# Patient Record
Sex: FEMALE | Race: WHITE | URBAN - METROPOLITAN AREA
[De-identification: names, ages, dates, MRNs, and addresses within clinical notes are randomized per-mention and may not be internally consistent; named-entity substitution may affect disease eponyms.]

---

## 2019-10-09 ENCOUNTER — EMERGENCY (EMERGENCY)
Facility: HOSPITAL | Age: 64
LOS: 0 days | Discharge: HOME | End: 2019-10-09
Attending: STUDENT IN AN ORGANIZED HEALTH CARE EDUCATION/TRAINING PROGRAM
Payer: COMMERCIAL

## 2019-10-09 VITALS — HEIGHT: 62 IN | WEIGHT: 125 LBS

## 2019-10-09 VITALS
SYSTOLIC BLOOD PRESSURE: 129 MMHG | DIASTOLIC BLOOD PRESSURE: 67 MMHG | RESPIRATION RATE: 18 BRPM | TEMPERATURE: 98 F | HEART RATE: 71 BPM | OXYGEN SATURATION: 98 %

## 2019-10-09 DIAGNOSIS — R10.11 RIGHT UPPER QUADRANT PAIN: ICD-10-CM

## 2019-10-09 DIAGNOSIS — Z85.3 PERSONAL HISTORY OF MALIGNANT NEOPLASM OF BREAST: ICD-10-CM

## 2019-10-09 LAB
ALBUMIN SERPL ELPH-MCNC: 4 G/DL — SIGNIFICANT CHANGE UP (ref 3.3–5)
ALP SERPL-CCNC: 67 U/L — SIGNIFICANT CHANGE UP (ref 40–120)
ALT FLD-CCNC: 31 U/L — SIGNIFICANT CHANGE UP (ref 12–78)
ANION GAP SERPL CALC-SCNC: 10 MMOL/L — SIGNIFICANT CHANGE UP (ref 5–17)
APPEARANCE UR: CLEAR — SIGNIFICANT CHANGE UP
APPEARANCE UR: CLEAR — SIGNIFICANT CHANGE UP
AST SERPL-CCNC: 26 U/L — SIGNIFICANT CHANGE UP (ref 15–37)
BASOPHILS # BLD AUTO: 0.05 K/UL — SIGNIFICANT CHANGE UP (ref 0–0.2)
BASOPHILS NFR BLD AUTO: 0.5 % — SIGNIFICANT CHANGE UP (ref 0–2)
BILIRUB SERPL-MCNC: 0.5 MG/DL — SIGNIFICANT CHANGE UP (ref 0.2–1.2)
BILIRUB UR-MCNC: NEGATIVE — SIGNIFICANT CHANGE UP
BILIRUB UR-MCNC: NEGATIVE — SIGNIFICANT CHANGE UP
BUN SERPL-MCNC: 29 MG/DL — HIGH (ref 7–23)
CALCIUM SERPL-MCNC: 8.8 MG/DL — SIGNIFICANT CHANGE UP (ref 8.5–10.1)
CHLORIDE SERPL-SCNC: 107 MMOL/L — SIGNIFICANT CHANGE UP (ref 96–108)
CO2 SERPL-SCNC: 19 MMOL/L — LOW (ref 22–31)
COLOR SPEC: YELLOW — SIGNIFICANT CHANGE UP
COLOR SPEC: YELLOW — SIGNIFICANT CHANGE UP
CREAT SERPL-MCNC: 1.45 MG/DL — HIGH (ref 0.5–1.3)
DIFF PNL FLD: ABNORMAL
DIFF PNL FLD: NEGATIVE — SIGNIFICANT CHANGE UP
EOSINOPHIL # BLD AUTO: 0.09 K/UL — SIGNIFICANT CHANGE UP (ref 0–0.5)
EOSINOPHIL NFR BLD AUTO: 0.9 % — SIGNIFICANT CHANGE UP (ref 0–6)
GLUCOSE SERPL-MCNC: 123 MG/DL — HIGH (ref 70–99)
GLUCOSE UR QL: NEGATIVE MG/DL — SIGNIFICANT CHANGE UP
GLUCOSE UR QL: NEGATIVE MG/DL — SIGNIFICANT CHANGE UP
HCT VFR BLD CALC: 39.6 % — SIGNIFICANT CHANGE UP (ref 34.5–45)
HGB BLD-MCNC: 13.5 G/DL — SIGNIFICANT CHANGE UP (ref 11.5–15.5)
IMM GRANULOCYTES NFR BLD AUTO: 1.3 % — SIGNIFICANT CHANGE UP (ref 0–1.5)
KETONES UR-MCNC: NEGATIVE — SIGNIFICANT CHANGE UP
KETONES UR-MCNC: NEGATIVE — SIGNIFICANT CHANGE UP
LACTATE SERPL-SCNC: 2.6 MMOL/L — HIGH (ref 0.7–2)
LEUKOCYTE ESTERASE UR-ACNC: ABNORMAL
LEUKOCYTE ESTERASE UR-ACNC: ABNORMAL
LIDOCAIN IGE QN: 294 U/L — SIGNIFICANT CHANGE UP (ref 73–393)
LYMPHOCYTES # BLD AUTO: 1.24 K/UL — SIGNIFICANT CHANGE UP (ref 1–3.3)
LYMPHOCYTES # BLD AUTO: 12.8 % — LOW (ref 13–44)
MAGNESIUM SERPL-MCNC: 1.6 MG/DL — SIGNIFICANT CHANGE UP (ref 1.6–2.6)
MCHC RBC-ENTMCNC: 32.1 PG — SIGNIFICANT CHANGE UP (ref 27–34)
MCHC RBC-ENTMCNC: 34.1 GM/DL — SIGNIFICANT CHANGE UP (ref 32–36)
MCV RBC AUTO: 94.3 FL — SIGNIFICANT CHANGE UP (ref 80–100)
MONOCYTES # BLD AUTO: 0.81 K/UL — SIGNIFICANT CHANGE UP (ref 0–0.9)
MONOCYTES NFR BLD AUTO: 8.4 % — SIGNIFICANT CHANGE UP (ref 2–14)
NEUTROPHILS # BLD AUTO: 7.37 K/UL — SIGNIFICANT CHANGE UP (ref 1.8–7.4)
NEUTROPHILS NFR BLD AUTO: 76.1 % — SIGNIFICANT CHANGE UP (ref 43–77)
NITRITE UR-MCNC: NEGATIVE — SIGNIFICANT CHANGE UP
NITRITE UR-MCNC: NEGATIVE — SIGNIFICANT CHANGE UP
PH UR: 6 — SIGNIFICANT CHANGE UP (ref 5–8)
PH UR: 6 — SIGNIFICANT CHANGE UP (ref 5–8)
PLATELET # BLD AUTO: 256 K/UL — SIGNIFICANT CHANGE UP (ref 150–400)
POTASSIUM SERPL-MCNC: 3.8 MMOL/L — SIGNIFICANT CHANGE UP (ref 3.5–5.3)
POTASSIUM SERPL-SCNC: 3.8 MMOL/L — SIGNIFICANT CHANGE UP (ref 3.5–5.3)
PROT SERPL-MCNC: 7.8 GM/DL — SIGNIFICANT CHANGE UP (ref 6–8.3)
PROT UR-MCNC: 30 MG/DL
PROT UR-MCNC: NEGATIVE MG/DL — SIGNIFICANT CHANGE UP
RBC # BLD: 4.2 M/UL — SIGNIFICANT CHANGE UP (ref 3.8–5.2)
RBC # FLD: 13.2 % — SIGNIFICANT CHANGE UP (ref 10.3–14.5)
SODIUM SERPL-SCNC: 136 MMOL/L — SIGNIFICANT CHANGE UP (ref 135–145)
SP GR SPEC: 1 — LOW (ref 1.01–1.02)
SP GR SPEC: 1.01 — SIGNIFICANT CHANGE UP (ref 1.01–1.02)
UROBILINOGEN FLD QL: NEGATIVE MG/DL — SIGNIFICANT CHANGE UP
UROBILINOGEN FLD QL: NEGATIVE MG/DL — SIGNIFICANT CHANGE UP
WBC # BLD: 9.69 K/UL — SIGNIFICANT CHANGE UP (ref 3.8–10.5)
WBC # FLD AUTO: 9.69 K/UL — SIGNIFICANT CHANGE UP (ref 3.8–10.5)

## 2019-10-09 PROCEDURE — 93005 ELECTROCARDIOGRAM TRACING: CPT

## 2019-10-09 PROCEDURE — 96376 TX/PRO/DX INJ SAME DRUG ADON: CPT

## 2019-10-09 PROCEDURE — 99285 EMERGENCY DEPT VISIT HI MDM: CPT

## 2019-10-09 PROCEDURE — 83605 ASSAY OF LACTIC ACID: CPT

## 2019-10-09 PROCEDURE — 81001 URINALYSIS AUTO W/SCOPE: CPT

## 2019-10-09 PROCEDURE — 36415 COLL VENOUS BLD VENIPUNCTURE: CPT

## 2019-10-09 PROCEDURE — 99285 EMERGENCY DEPT VISIT HI MDM: CPT | Mod: 25

## 2019-10-09 PROCEDURE — 83690 ASSAY OF LIPASE: CPT

## 2019-10-09 PROCEDURE — 93010 ELECTROCARDIOGRAM REPORT: CPT

## 2019-10-09 PROCEDURE — 74176 CT ABD & PELVIS W/O CONTRAST: CPT

## 2019-10-09 PROCEDURE — 80053 COMPREHEN METABOLIC PANEL: CPT

## 2019-10-09 PROCEDURE — 96374 THER/PROPH/DIAG INJ IV PUSH: CPT | Mod: 59

## 2019-10-09 PROCEDURE — 96375 TX/PRO/DX INJ NEW DRUG ADDON: CPT

## 2019-10-09 PROCEDURE — 87086 URINE CULTURE/COLONY COUNT: CPT

## 2019-10-09 PROCEDURE — 85025 COMPLETE CBC W/AUTO DIFF WBC: CPT

## 2019-10-09 PROCEDURE — 74176 CT ABD & PELVIS W/O CONTRAST: CPT | Mod: 26

## 2019-10-09 PROCEDURE — 83735 ASSAY OF MAGNESIUM: CPT

## 2019-10-09 PROCEDURE — 96361 HYDRATE IV INFUSION ADD-ON: CPT

## 2019-10-09 RX ORDER — SODIUM CHLORIDE 9 MG/ML
1000 INJECTION INTRAMUSCULAR; INTRAVENOUS; SUBCUTANEOUS ONCE
Refills: 0 | Status: COMPLETED | OUTPATIENT
Start: 2019-10-09 | End: 2019-10-09

## 2019-10-09 RX ORDER — MORPHINE SULFATE 50 MG/1
4 CAPSULE, EXTENDED RELEASE ORAL ONCE
Refills: 0 | Status: DISCONTINUED | OUTPATIENT
Start: 2019-10-09 | End: 2019-10-09

## 2019-10-09 RX ORDER — FAMOTIDINE 10 MG/ML
20 INJECTION INTRAVENOUS ONCE
Refills: 0 | Status: COMPLETED | OUTPATIENT
Start: 2019-10-09 | End: 2019-10-09

## 2019-10-09 RX ORDER — ONDANSETRON 8 MG/1
4 TABLET, FILM COATED ORAL ONCE
Refills: 0 | Status: COMPLETED | OUTPATIENT
Start: 2019-10-09 | End: 2019-10-09

## 2019-10-09 RX ADMIN — MORPHINE SULFATE 4 MILLIGRAM(S): 50 CAPSULE, EXTENDED RELEASE ORAL at 10:30

## 2019-10-09 RX ADMIN — ONDANSETRON 4 MILLIGRAM(S): 8 TABLET, FILM COATED ORAL at 09:03

## 2019-10-09 RX ADMIN — SODIUM CHLORIDE 1000 MILLILITER(S): 9 INJECTION INTRAMUSCULAR; INTRAVENOUS; SUBCUTANEOUS at 11:19

## 2019-10-09 RX ADMIN — SODIUM CHLORIDE 1000 MILLILITER(S): 9 INJECTION INTRAMUSCULAR; INTRAVENOUS; SUBCUTANEOUS at 10:15

## 2019-10-09 RX ADMIN — Medication 30 MILLILITER(S): at 11:21

## 2019-10-09 RX ADMIN — MORPHINE SULFATE 4 MILLIGRAM(S): 50 CAPSULE, EXTENDED RELEASE ORAL at 09:02

## 2019-10-09 RX ADMIN — SODIUM CHLORIDE 1000 MILLILITER(S): 9 INJECTION INTRAMUSCULAR; INTRAVENOUS; SUBCUTANEOUS at 09:03

## 2019-10-09 RX ADMIN — MORPHINE SULFATE 4 MILLIGRAM(S): 50 CAPSULE, EXTENDED RELEASE ORAL at 09:17

## 2019-10-09 RX ADMIN — SODIUM CHLORIDE 1000 MILLILITER(S): 9 INJECTION INTRAMUSCULAR; INTRAVENOUS; SUBCUTANEOUS at 10:11

## 2019-10-09 RX ADMIN — MORPHINE SULFATE 4 MILLIGRAM(S): 50 CAPSULE, EXTENDED RELEASE ORAL at 10:11

## 2019-10-09 RX ADMIN — FAMOTIDINE 20 MILLIGRAM(S): 10 INJECTION INTRAVENOUS at 10:13

## 2019-10-09 NOTE — ED ADULT NURSE NOTE - NSIMPLEMENTINTERV_GEN_ALL_ED
Implemented All Universal Safety Interventions:  [place] to call system. Call bell, personal items and telephone within reach. Instruct patient to call for assistance. Room bathroom lighting operational. Non-slip footwear when patient is off stretcher. Physically safe environment: no spills, clutter or unnecessary equipment. Stretcher in lowest position, wheels locked, appropriate side rails in place.

## 2019-10-09 NOTE — ED PROVIDER NOTE - PROGRESS NOTE DETAILS
Buddy DO: Patient feeling better at this time after pepcid; IVF running; no urinary complaints- will not treat- shared decision making; patient given copy of all labs, ct, urine; instructed to f/u with her private GI in 1-2 days without fail; strict return precautions given.

## 2019-10-09 NOTE — ED PROVIDER NOTE - CONSTITUTIONAL, MLM
normal... Well appearing, well nourished, awake, alert, oriented to person, place, time/situation and in mild/moderate apparent painful distress.

## 2019-10-09 NOTE — ED ADULT NURSE NOTE - OBJECTIVE STATEMENT
Patient presents to ED A&o x3,  ambulating from triage to room c/o upper epigastric abdominal pain. hx of breast ca, right mastectomy and colon resection w ostomy, As per patient pain began at 2am this morning.  Pt describes pain as sudden onset, intermittent sharp epigastric region. Pt denies eating abnormally. Pt endorses intermittent nausea, denies V/D/C. Patient denies taking medications for symptoms. Pt denies CP, SOB, HA, dizziness, vision changes, and urinary symptoms. Family present at bedside.

## 2019-10-09 NOTE — ED ADULT NURSE NOTE - GASTROINTESTINAL WDL
Abdomen soft, nontender, nondistended, pt has ostomy in lower abdominal region. Ostomy assessed- appears WNL. pt denies complaints.

## 2019-10-09 NOTE — ED PROVIDER NOTE - PATIENT PORTAL LINK FT
You can access the FollowMyHealth Patient Portal offered by [facility] by registering at the following website: [email or web address]. By joining [facility]’s FollowMyHealth portal, you will also be able to view your health information using other applications (apps) compatible with our system.

## 2019-10-09 NOTE — ED PROVIDER NOTE - OBJECTIVE STATEMENT
65 yo female with upper abdominal pain that awoke patient from sleep at 2am this morning; patient reports similar symptoms 2 weeks ago that went away; no chest pain; no sob; ?nausea; no vomiting; hx of colon resection with osteomy; hx of crohn's disease; no fever or chills; no cough; no urinary complaints.

## 2019-10-09 NOTE — ED ADULT TRIAGE NOTE - CHIEF COMPLAINT QUOTE
Patient presents with mid upper abdominal pain since 2 AM. Patient reports pain started suddenly with nausea. Patient denies nausea vomiting or diarrhea. Patient has history of Crohn's. SAMANTHA Cabrera

## 2019-10-10 LAB
CULTURE RESULTS: SIGNIFICANT CHANGE UP
SPECIMEN SOURCE: SIGNIFICANT CHANGE UP

## 2019-10-13 RX ORDER — AMOXICILLIN 250 MG/5ML
1 SUSPENSION, RECONSTITUTED, ORAL (ML) ORAL
Qty: 0 | Refills: 0 | DISCHARGE
Start: 2019-10-13 | End: 2019-10-23

## 2019-10-14 ENCOUNTER — INPATIENT (INPATIENT)
Facility: HOSPITAL | Age: 64
LOS: 3 days | Discharge: HOME | DRG: 871 | End: 2019-10-18
Attending: HOSPITALIST | Admitting: FAMILY MEDICINE
Payer: COMMERCIAL

## 2019-10-14 VITALS — WEIGHT: 125 LBS | HEIGHT: 62 IN

## 2019-10-14 DIAGNOSIS — R50.9 FEVER, UNSPECIFIED: ICD-10-CM

## 2019-10-14 LAB
ALBUMIN SERPL ELPH-MCNC: 3.3 G/DL — SIGNIFICANT CHANGE UP (ref 3.3–5)
ALP SERPL-CCNC: 59 U/L — SIGNIFICANT CHANGE UP (ref 40–120)
ALT FLD-CCNC: 32 U/L — SIGNIFICANT CHANGE UP (ref 12–78)
ANION GAP SERPL CALC-SCNC: 12 MMOL/L — SIGNIFICANT CHANGE UP (ref 5–17)
APPEARANCE UR: CLEAR — SIGNIFICANT CHANGE UP
APTT BLD: 28.7 SEC — SIGNIFICANT CHANGE UP (ref 27.5–36.3)
AST SERPL-CCNC: 35 U/L — SIGNIFICANT CHANGE UP (ref 15–37)
BASOPHILS # BLD AUTO: 0.04 K/UL — SIGNIFICANT CHANGE UP (ref 0–0.2)
BASOPHILS NFR BLD AUTO: 0.5 % — SIGNIFICANT CHANGE UP (ref 0–2)
BILIRUB SERPL-MCNC: 0.7 MG/DL — SIGNIFICANT CHANGE UP (ref 0.2–1.2)
BILIRUB UR-MCNC: NEGATIVE — SIGNIFICANT CHANGE UP
BUN SERPL-MCNC: 24 MG/DL — HIGH (ref 7–23)
CALCIUM SERPL-MCNC: 9.4 MG/DL — SIGNIFICANT CHANGE UP (ref 8.5–10.1)
CHLORIDE SERPL-SCNC: 102 MMOL/L — SIGNIFICANT CHANGE UP (ref 96–108)
CO2 SERPL-SCNC: 21 MMOL/L — LOW (ref 22–31)
COLOR SPEC: YELLOW — SIGNIFICANT CHANGE UP
CREAT SERPL-MCNC: 1.42 MG/DL — HIGH (ref 0.5–1.3)
DIFF PNL FLD: ABNORMAL
EOSINOPHIL # BLD AUTO: 0.01 K/UL — SIGNIFICANT CHANGE UP (ref 0–0.5)
EOSINOPHIL NFR BLD AUTO: 0.1 % — SIGNIFICANT CHANGE UP (ref 0–6)
GLUCOSE SERPL-MCNC: 99 MG/DL — SIGNIFICANT CHANGE UP (ref 70–99)
GLUCOSE UR QL: NEGATIVE MG/DL — SIGNIFICANT CHANGE UP
HCT VFR BLD CALC: 38.8 % — SIGNIFICANT CHANGE UP (ref 34.5–45)
HGB BLD-MCNC: 13.1 G/DL — SIGNIFICANT CHANGE UP (ref 11.5–15.5)
IMM GRANULOCYTES NFR BLD AUTO: 0.9 % — SIGNIFICANT CHANGE UP (ref 0–1.5)
INR BLD: 1.03 RATIO — SIGNIFICANT CHANGE UP (ref 0.88–1.16)
KETONES UR-MCNC: NEGATIVE — SIGNIFICANT CHANGE UP
LACTATE SERPL-SCNC: 1.6 MMOL/L — SIGNIFICANT CHANGE UP (ref 0.7–2)
LEUKOCYTE ESTERASE UR-ACNC: NEGATIVE — SIGNIFICANT CHANGE UP
LYMPHOCYTES # BLD AUTO: 0.74 K/UL — LOW (ref 1–3.3)
LYMPHOCYTES # BLD AUTO: 8.7 % — LOW (ref 13–44)
MCHC RBC-ENTMCNC: 31.3 PG — SIGNIFICANT CHANGE UP (ref 27–34)
MCHC RBC-ENTMCNC: 33.8 GM/DL — SIGNIFICANT CHANGE UP (ref 32–36)
MCV RBC AUTO: 92.8 FL — SIGNIFICANT CHANGE UP (ref 80–100)
MONOCYTES # BLD AUTO: 0.43 K/UL — SIGNIFICANT CHANGE UP (ref 0–0.9)
MONOCYTES NFR BLD AUTO: 5 % — SIGNIFICANT CHANGE UP (ref 2–14)
NEUTROPHILS # BLD AUTO: 7.25 K/UL — SIGNIFICANT CHANGE UP (ref 1.8–7.4)
NEUTROPHILS NFR BLD AUTO: 84.8 % — HIGH (ref 43–77)
NITRITE UR-MCNC: NEGATIVE — SIGNIFICANT CHANGE UP
PH UR: 6 — SIGNIFICANT CHANGE UP (ref 5–8)
PLATELET # BLD AUTO: 188 K/UL — SIGNIFICANT CHANGE UP (ref 150–400)
POTASSIUM SERPL-MCNC: 3.2 MMOL/L — LOW (ref 3.5–5.3)
POTASSIUM SERPL-SCNC: 3.2 MMOL/L — LOW (ref 3.5–5.3)
PROT SERPL-MCNC: 8 GM/DL — SIGNIFICANT CHANGE UP (ref 6–8.3)
PROT UR-MCNC: 100 MG/DL
PROTHROM AB SERPL-ACNC: 11.5 SEC — SIGNIFICANT CHANGE UP (ref 10–12.9)
RBC # BLD: 4.18 M/UL — SIGNIFICANT CHANGE UP (ref 3.8–5.2)
RBC # FLD: 13.4 % — SIGNIFICANT CHANGE UP (ref 10.3–14.5)
SODIUM SERPL-SCNC: 135 MMOL/L — SIGNIFICANT CHANGE UP (ref 135–145)
SP GR SPEC: 1.01 — SIGNIFICANT CHANGE UP (ref 1.01–1.02)
UROBILINOGEN FLD QL: NEGATIVE MG/DL — SIGNIFICANT CHANGE UP
WBC # BLD: 8.55 K/UL — SIGNIFICANT CHANGE UP (ref 3.8–10.5)
WBC # FLD AUTO: 8.55 K/UL — SIGNIFICANT CHANGE UP (ref 3.8–10.5)

## 2019-10-14 PROCEDURE — 87798 DETECT AGENT NOS DNA AMP: CPT

## 2019-10-14 PROCEDURE — 76700 US EXAM ABDOM COMPLETE: CPT

## 2019-10-14 PROCEDURE — 85652 RBC SED RATE AUTOMATED: CPT

## 2019-10-14 PROCEDURE — 86803 HEPATITIS C AB TEST: CPT

## 2019-10-14 PROCEDURE — C9113: CPT

## 2019-10-14 PROCEDURE — 70450 CT HEAD/BRAIN W/O DYE: CPT

## 2019-10-14 PROCEDURE — 71045 X-RAY EXAM CHEST 1 VIEW: CPT | Mod: 26

## 2019-10-14 PROCEDURE — 83605 ASSAY OF LACTIC ACID: CPT

## 2019-10-14 PROCEDURE — 71250 CT THORAX DX C-: CPT

## 2019-10-14 PROCEDURE — 80307 DRUG TEST PRSMV CHEM ANLYZR: CPT

## 2019-10-14 PROCEDURE — 87633 RESP VIRUS 12-25 TARGETS: CPT

## 2019-10-14 PROCEDURE — 86618 LYME DISEASE ANTIBODY: CPT

## 2019-10-14 PROCEDURE — 87040 BLOOD CULTURE FOR BACTERIA: CPT

## 2019-10-14 PROCEDURE — 82962 GLUCOSE BLOOD TEST: CPT

## 2019-10-14 PROCEDURE — 82728 ASSAY OF FERRITIN: CPT

## 2019-10-14 PROCEDURE — 74181 MRI ABDOMEN W/O CONTRAST: CPT

## 2019-10-14 PROCEDURE — 80048 BASIC METABOLIC PNL TOTAL CA: CPT

## 2019-10-14 PROCEDURE — 94640 AIRWAY INHALATION TREATMENT: CPT

## 2019-10-14 PROCEDURE — 87486 CHLMYD PNEUM DNA AMP PROBE: CPT

## 2019-10-14 PROCEDURE — 86778 TOXOPLASMA ANTIBODY IGM: CPT

## 2019-10-14 PROCEDURE — 93010 ELECTROCARDIOGRAM REPORT: CPT

## 2019-10-14 PROCEDURE — 86140 C-REACTIVE PROTEIN: CPT

## 2019-10-14 PROCEDURE — 87507 IADNA-DNA/RNA PROBE TQ 12-25: CPT

## 2019-10-14 PROCEDURE — 83615 LACTATE (LD) (LDH) ENZYME: CPT

## 2019-10-14 PROCEDURE — 36415 COLL VENOUS BLD VENIPUNCTURE: CPT

## 2019-10-14 PROCEDURE — 80053 COMPREHEN METABOLIC PANEL: CPT

## 2019-10-14 PROCEDURE — 84100 ASSAY OF PHOSPHORUS: CPT

## 2019-10-14 PROCEDURE — 86645 CMV ANTIBODY IGM: CPT

## 2019-10-14 PROCEDURE — 83036 HEMOGLOBIN GLYCOSYLATED A1C: CPT

## 2019-10-14 PROCEDURE — 85027 COMPLETE CBC AUTOMATED: CPT

## 2019-10-14 PROCEDURE — 81001 URINALYSIS AUTO W/SCOPE: CPT

## 2019-10-14 PROCEDURE — 85025 COMPLETE CBC W/AUTO DIFF WBC: CPT

## 2019-10-14 PROCEDURE — 83735 ASSAY OF MAGNESIUM: CPT

## 2019-10-14 PROCEDURE — 87581 M.PNEUMON DNA AMP PROBE: CPT

## 2019-10-14 PROCEDURE — 87086 URINE CULTURE/COLONY COUNT: CPT

## 2019-10-14 RX ORDER — SODIUM CHLORIDE 9 MG/ML
1750 INJECTION INTRAMUSCULAR; INTRAVENOUS; SUBCUTANEOUS ONCE
Refills: 0 | Status: COMPLETED | OUTPATIENT
Start: 2019-10-14 | End: 2019-10-14

## 2019-10-14 RX ORDER — ACETAMINOPHEN 500 MG
650 TABLET ORAL ONCE
Refills: 0 | Status: COMPLETED | OUTPATIENT
Start: 2019-10-14 | End: 2019-10-14

## 2019-10-14 RX ORDER — ACETAMINOPHEN 500 MG
650 TABLET ORAL EVERY 6 HOURS
Refills: 0 | Status: DISCONTINUED | OUTPATIENT
Start: 2019-10-14 | End: 2019-10-15

## 2019-10-14 RX ORDER — VANCOMYCIN HCL 1 G
750 VIAL (EA) INTRAVENOUS ONCE
Refills: 0 | Status: COMPLETED | OUTPATIENT
Start: 2019-10-14 | End: 2019-10-14

## 2019-10-14 RX ORDER — CEFEPIME 1 G/1
2000 INJECTION, POWDER, FOR SOLUTION INTRAMUSCULAR; INTRAVENOUS ONCE
Refills: 0 | Status: COMPLETED | OUTPATIENT
Start: 2019-10-14 | End: 2019-10-14

## 2019-10-14 RX ORDER — SODIUM CHLORIDE 9 MG/ML
1000 INJECTION INTRAMUSCULAR; INTRAVENOUS; SUBCUTANEOUS
Refills: 0 | Status: DISCONTINUED | OUTPATIENT
Start: 2019-10-14 | End: 2019-10-15

## 2019-10-14 RX ADMIN — Medication 650 MILLIGRAM(S): at 21:11

## 2019-10-14 RX ADMIN — CEFEPIME 2000 MILLIGRAM(S): 1 INJECTION, POWDER, FOR SOLUTION INTRAMUSCULAR; INTRAVENOUS at 21:25

## 2019-10-14 RX ADMIN — Medication 750 MILLIGRAM(S): at 22:30

## 2019-10-14 RX ADMIN — Medication 650 MILLIGRAM(S): at 23:25

## 2019-10-14 RX ADMIN — CEFEPIME 100 MILLIGRAM(S): 1 INJECTION, POWDER, FOR SOLUTION INTRAMUSCULAR; INTRAVENOUS at 21:11

## 2019-10-14 RX ADMIN — SODIUM CHLORIDE 1750 MILLILITER(S): 9 INJECTION INTRAMUSCULAR; INTRAVENOUS; SUBCUTANEOUS at 21:11

## 2019-10-14 RX ADMIN — SODIUM CHLORIDE 1750 MILLILITER(S): 9 INJECTION INTRAMUSCULAR; INTRAVENOUS; SUBCUTANEOUS at 22:30

## 2019-10-14 RX ADMIN — Medication 250 MILLIGRAM(S): at 21:25

## 2019-10-14 NOTE — ED STATDOCS - PROGRESS NOTE DETAILS
Chap[place] Martinez for ED attending Dr. Casanova: 65 y/o f with PMHx of Crohn's presenting to the ED c/o fever x4 days. Tmax of 104.1 today. Last took Motrin at 4pm today. Seen at Urgent Care and given Amoxicillin, UC said pt had fluid in sinuses. Pt seen in ED x5 days ago for upper abd pain, states pain is still present but has improved. PCP: Dr. Austin. Will send pt to main ED for further evaluation.

## 2019-10-14 NOTE — ED PROVIDER NOTE - OBJECTIVE STATEMENT
Pt comes to the ER for continued fevers up to 104 today. Pt was seen in the ER on Thursday for abdominal pain CT was negative for a positive findings. Began to develop fevers on Friday which progressively increased. Was taking Ibuprofen which did reduce the temperature, Had chills with N/V/D

## 2019-10-14 NOTE — ED ADULT NURSE NOTE - OBJECTIVE STATEMENT
pt comes to the ER for continued fevers up to 104 today. Pt was seen in the ER on Thursday for abdo pain CT was negative for a positive findings. Began to develop fevers on Friday which progressivlely increased. Was taking Ibuprofen which did reduce the temperature, Had chills with N/V/D has been able to drink fluids without vomiting. Denies and recent illnesses, denies cough, frequency urgency, does c/o frontal headache without visual changes.   PE alert oriented HEENT NL cephalic neck supple no thyroid  enlargement, chest clear, NRS S1 S2 abdo soft not tender, ext negative swelling.

## 2019-10-14 NOTE — ED ADULT TRIAGE NOTE - CHIEF COMPLAINT QUOTE
Fever since Friday. highest 104.1. urgent care gave amoxicillin and said patient had fluid in sinuses. patient was seen in  ED Wednesday for abdominal pain.

## 2019-10-14 NOTE — ED PROVIDER NOTE - PROGRESS NOTE DETAILS
pt comes to the ER for continued fevers up to 104 today. Pt was seen in the ER on Thursday for abdo pain CT was negative for a positive findings. Began to develop fevers on Friday which progressivlely increased. Was taking Ibuprofen which did reduce the temperature, Had chills with N/V/D has been able to drink fluids without vomiting. Denies and recent illnesses, denies cough, frequency urgency, does c/o frontal headache without visual changes.   PE alert oriented HEENT NL cephalic neck supple no thyroid  enlargement, chest clear, NRS S1 S2 abdo soft not tender, ext negative swelling

## 2019-10-14 NOTE — ED ADULT NURSE NOTE - NSIMPLEMENTINTERV_GEN_ALL_ED
Implemented All Universal Safety Interventions:  [place] to call system. Call bell, personal items and telephone within reach. Instruct patient to call for assistance. Room bathroom lighting operational. Non-slip footwear when patient is off stretcher. Physically safe environment: no spills, clutter or unnecessary equipment. Stretcher in lowest position, wheels locked, appropriate side rails in place.

## 2019-10-15 DIAGNOSIS — Z98.890 OTHER SPECIFIED POSTPROCEDURAL STATES: Chronic | ICD-10-CM

## 2019-10-15 DIAGNOSIS — Z90.11 ACQUIRED ABSENCE OF RIGHT BREAST AND NIPPLE: Chronic | ICD-10-CM

## 2019-10-15 DIAGNOSIS — Z90.49 ACQUIRED ABSENCE OF OTHER SPECIFIED PARTS OF DIGESTIVE TRACT: Chronic | ICD-10-CM

## 2019-10-15 PROBLEM — C50.919 MALIGNANT NEOPLASM OF UNSPECIFIED SITE OF UNSPECIFIED FEMALE BREAST: Chronic | Status: ACTIVE | Noted: 2019-10-09

## 2019-10-15 LAB
ADD ON TEST-SPECIMEN IN LAB: SIGNIFICANT CHANGE UP
ALBUMIN SERPL ELPH-MCNC: 2.5 G/DL — LOW (ref 3.3–5)
ALP SERPL-CCNC: 44 U/L — SIGNIFICANT CHANGE UP (ref 40–120)
ALT FLD-CCNC: 26 U/L — SIGNIFICANT CHANGE UP (ref 12–78)
ANION GAP SERPL CALC-SCNC: 10 MMOL/L — SIGNIFICANT CHANGE UP (ref 5–17)
AST SERPL-CCNC: 30 U/L — SIGNIFICANT CHANGE UP (ref 15–37)
BASOPHILS # BLD AUTO: 0.02 K/UL — SIGNIFICANT CHANGE UP (ref 0–0.2)
BASOPHILS NFR BLD AUTO: 0.4 % — SIGNIFICANT CHANGE UP (ref 0–2)
BILIRUB SERPL-MCNC: 0.5 MG/DL — SIGNIFICANT CHANGE UP (ref 0.2–1.2)
BUN SERPL-MCNC: 18 MG/DL — SIGNIFICANT CHANGE UP (ref 7–23)
CALCIUM SERPL-MCNC: 7.9 MG/DL — LOW (ref 8.5–10.1)
CHLORIDE SERPL-SCNC: 112 MMOL/L — HIGH (ref 96–108)
CO2 SERPL-SCNC: 19 MMOL/L — LOW (ref 22–31)
CREAT SERPL-MCNC: 1.04 MG/DL — SIGNIFICANT CHANGE UP (ref 0.5–1.3)
EOSINOPHIL # BLD AUTO: 0.01 K/UL — SIGNIFICANT CHANGE UP (ref 0–0.5)
EOSINOPHIL NFR BLD AUTO: 0.2 % — SIGNIFICANT CHANGE UP (ref 0–6)
GLUCOSE SERPL-MCNC: 107 MG/DL — HIGH (ref 70–99)
HBA1C BLD-MCNC: 5.9 % — HIGH (ref 4–5.6)
HCT VFR BLD CALC: 31.1 % — LOW (ref 34.5–45)
HCV AB S/CO SERPL IA: 0.1 S/CO — SIGNIFICANT CHANGE UP (ref 0–0.99)
HCV AB SERPL-IMP: SIGNIFICANT CHANGE UP
HGB BLD-MCNC: 10.4 G/DL — LOW (ref 11.5–15.5)
IMM GRANULOCYTES NFR BLD AUTO: 1.5 % — SIGNIFICANT CHANGE UP (ref 0–1.5)
LACTATE SERPL-SCNC: 1.6 MMOL/L — SIGNIFICANT CHANGE UP (ref 0.7–2)
LYMPHOCYTES # BLD AUTO: 0.63 K/UL — LOW (ref 1–3.3)
LYMPHOCYTES # BLD AUTO: 12.2 % — LOW (ref 13–44)
MAGNESIUM SERPL-MCNC: 1.7 MG/DL — SIGNIFICANT CHANGE UP (ref 1.6–2.6)
MCHC RBC-ENTMCNC: 31.2 PG — SIGNIFICANT CHANGE UP (ref 27–34)
MCHC RBC-ENTMCNC: 33.4 GM/DL — SIGNIFICANT CHANGE UP (ref 32–36)
MCV RBC AUTO: 93.4 FL — SIGNIFICANT CHANGE UP (ref 80–100)
MONOCYTES # BLD AUTO: 0.33 K/UL — SIGNIFICANT CHANGE UP (ref 0–0.9)
MONOCYTES NFR BLD AUTO: 6.4 % — SIGNIFICANT CHANGE UP (ref 2–14)
NEUTROPHILS # BLD AUTO: 4.11 K/UL — SIGNIFICANT CHANGE UP (ref 1.8–7.4)
NEUTROPHILS NFR BLD AUTO: 79.3 % — HIGH (ref 43–77)
PCP SPEC-MCNC: SIGNIFICANT CHANGE UP
PHOSPHATE SERPL-MCNC: 2.7 MG/DL — SIGNIFICANT CHANGE UP (ref 2.5–4.5)
PLATELET # BLD AUTO: 152 K/UL — SIGNIFICANT CHANGE UP (ref 150–400)
POTASSIUM SERPL-MCNC: 3.2 MMOL/L — LOW (ref 3.5–5.3)
POTASSIUM SERPL-SCNC: 3.2 MMOL/L — LOW (ref 3.5–5.3)
PROT SERPL-MCNC: 6 GM/DL — SIGNIFICANT CHANGE UP (ref 6–8.3)
RAPID RVP RESULT: SIGNIFICANT CHANGE UP
RBC # BLD: 3.33 M/UL — LOW (ref 3.8–5.2)
RBC # FLD: 13.3 % — SIGNIFICANT CHANGE UP (ref 10.3–14.5)
SODIUM SERPL-SCNC: 141 MMOL/L — SIGNIFICANT CHANGE UP (ref 135–145)
WBC # BLD: 5.18 K/UL — SIGNIFICANT CHANGE UP (ref 3.8–10.5)
WBC # FLD AUTO: 5.18 K/UL — SIGNIFICANT CHANGE UP (ref 3.8–10.5)

## 2019-10-15 PROCEDURE — 71250 CT THORAX DX C-: CPT | Mod: 26

## 2019-10-15 PROCEDURE — 76700 US EXAM ABDOM COMPLETE: CPT | Mod: 26

## 2019-10-15 PROCEDURE — 74181 MRI ABDOMEN W/O CONTRAST: CPT | Mod: 26

## 2019-10-15 PROCEDURE — 70450 CT HEAD/BRAIN W/O DYE: CPT | Mod: 26

## 2019-10-15 RX ORDER — PANTOPRAZOLE SODIUM 20 MG/1
40 TABLET, DELAYED RELEASE ORAL DAILY
Refills: 0 | Status: DISCONTINUED | OUTPATIENT
Start: 2019-10-15 | End: 2019-10-18

## 2019-10-15 RX ORDER — IPRATROPIUM/ALBUTEROL SULFATE 18-103MCG
3 AEROSOL WITH ADAPTER (GRAM) INHALATION EVERY 6 HOURS
Refills: 0 | Status: DISCONTINUED | OUTPATIENT
Start: 2019-10-15 | End: 2019-10-17

## 2019-10-15 RX ORDER — PIPERACILLIN AND TAZOBACTAM 4; .5 G/20ML; G/20ML
3.38 INJECTION, POWDER, LYOPHILIZED, FOR SOLUTION INTRAVENOUS ONCE
Refills: 0 | Status: COMPLETED | OUTPATIENT
Start: 2019-10-15 | End: 2019-10-15

## 2019-10-15 RX ORDER — ACETAMINOPHEN 500 MG
650 TABLET ORAL EVERY 6 HOURS
Refills: 0 | Status: DISCONTINUED | OUTPATIENT
Start: 2019-10-15 | End: 2019-10-18

## 2019-10-15 RX ORDER — POTASSIUM CHLORIDE 20 MEQ
40 PACKET (EA) ORAL ONCE
Refills: 0 | Status: COMPLETED | OUTPATIENT
Start: 2019-10-15 | End: 2019-10-15

## 2019-10-15 RX ORDER — ACETAMINOPHEN 500 MG
1000 TABLET ORAL ONCE
Refills: 0 | Status: COMPLETED | OUTPATIENT
Start: 2019-10-15 | End: 2019-10-15

## 2019-10-15 RX ORDER — TAMOXIFEN CITRATE 20 MG/1
20 TABLET, FILM COATED ORAL DAILY
Refills: 0 | Status: DISCONTINUED | OUTPATIENT
Start: 2019-10-15 | End: 2019-10-18

## 2019-10-15 RX ORDER — ACETAMINOPHEN 500 MG
650 TABLET ORAL ONCE
Refills: 0 | Status: COMPLETED | OUTPATIENT
Start: 2019-10-15 | End: 2019-10-15

## 2019-10-15 RX ORDER — HEPARIN SODIUM 5000 [USP'U]/ML
5000 INJECTION INTRAVENOUS; SUBCUTANEOUS EVERY 8 HOURS
Refills: 0 | Status: DISCONTINUED | OUTPATIENT
Start: 2019-10-15 | End: 2019-10-18

## 2019-10-15 RX ORDER — ACETAMINOPHEN 500 MG
650 TABLET ORAL EVERY 6 HOURS
Refills: 0 | Status: DISCONTINUED | OUTPATIENT
Start: 2019-10-15 | End: 2019-10-15

## 2019-10-15 RX ORDER — POTASSIUM CHLORIDE 20 MEQ
10 PACKET (EA) ORAL
Refills: 0 | Status: COMPLETED | OUTPATIENT
Start: 2019-10-15 | End: 2019-10-15

## 2019-10-15 RX ORDER — POTASSIUM CHLORIDE 20 MEQ
40 PACKET (EA) ORAL EVERY 4 HOURS
Refills: 0 | Status: DISCONTINUED | OUTPATIENT
Start: 2019-10-15 | End: 2019-10-15

## 2019-10-15 RX ORDER — SODIUM CHLORIDE 9 MG/ML
1000 INJECTION INTRAMUSCULAR; INTRAVENOUS; SUBCUTANEOUS
Refills: 0 | Status: DISCONTINUED | OUTPATIENT
Start: 2019-10-15 | End: 2019-10-17

## 2019-10-15 RX ORDER — SODIUM CHLORIDE 9 MG/ML
1000 INJECTION, SOLUTION INTRAVENOUS ONCE
Refills: 0 | Status: COMPLETED | OUTPATIENT
Start: 2019-10-15 | End: 2019-10-15

## 2019-10-15 RX ORDER — CEFTRIAXONE 500 MG/1
1000 INJECTION, POWDER, FOR SOLUTION INTRAMUSCULAR; INTRAVENOUS EVERY 24 HOURS
Refills: 0 | Status: DISCONTINUED | OUTPATIENT
Start: 2019-10-15 | End: 2019-10-15

## 2019-10-15 RX ORDER — INFLUENZA VIRUS VACCINE 15; 15; 15; 15 UG/.5ML; UG/.5ML; UG/.5ML; UG/.5ML
0.5 SUSPENSION INTRAMUSCULAR ONCE
Refills: 0 | Status: COMPLETED | OUTPATIENT
Start: 2019-10-15 | End: 2019-10-15

## 2019-10-15 RX ORDER — AZITHROMYCIN 500 MG/1
500 TABLET, FILM COATED ORAL ONCE
Refills: 0 | Status: COMPLETED | OUTPATIENT
Start: 2019-10-15 | End: 2019-10-15

## 2019-10-15 RX ORDER — AZITHROMYCIN 500 MG/1
TABLET, FILM COATED ORAL
Refills: 0 | Status: DISCONTINUED | OUTPATIENT
Start: 2019-10-15 | End: 2019-10-15

## 2019-10-15 RX ORDER — CHOLECALCIFEROL (VITAMIN D3) 125 MCG
1000 CAPSULE ORAL DAILY
Refills: 0 | Status: DISCONTINUED | OUTPATIENT
Start: 2019-10-15 | End: 2019-10-18

## 2019-10-15 RX ORDER — ONDANSETRON 8 MG/1
4 TABLET, FILM COATED ORAL EVERY 6 HOURS
Refills: 0 | Status: DISCONTINUED | OUTPATIENT
Start: 2019-10-15 | End: 2019-10-18

## 2019-10-15 RX ORDER — SODIUM CHLORIDE 9 MG/ML
750 INJECTION, SOLUTION INTRAVENOUS ONCE
Refills: 0 | Status: COMPLETED | OUTPATIENT
Start: 2019-10-15 | End: 2019-10-15

## 2019-10-15 RX ORDER — PIPERACILLIN AND TAZOBACTAM 4; .5 G/20ML; G/20ML
3.38 INJECTION, POWDER, LYOPHILIZED, FOR SOLUTION INTRAVENOUS EVERY 8 HOURS
Refills: 0 | Status: DISCONTINUED | OUTPATIENT
Start: 2019-10-15 | End: 2019-10-16

## 2019-10-15 RX ORDER — BUDESONIDE AND FORMOTEROL FUMARATE DIHYDRATE 160; 4.5 UG/1; UG/1
2 AEROSOL RESPIRATORY (INHALATION)
Refills: 0 | Status: DISCONTINUED | OUTPATIENT
Start: 2019-10-15 | End: 2019-10-18

## 2019-10-15 RX ADMIN — Medication 100 MILLIEQUIVALENT(S): at 04:39

## 2019-10-15 RX ADMIN — AZITHROMYCIN 255 MILLIGRAM(S): 500 TABLET, FILM COATED ORAL at 08:30

## 2019-10-15 RX ADMIN — Medication 650 MILLIGRAM(S): at 23:30

## 2019-10-15 RX ADMIN — Medication 3 MILLILITER(S): at 13:21

## 2019-10-15 RX ADMIN — HEPARIN SODIUM 5000 UNIT(S): 5000 INJECTION INTRAVENOUS; SUBCUTANEOUS at 06:26

## 2019-10-15 RX ADMIN — Medication 3 MILLILITER(S): at 07:38

## 2019-10-15 RX ADMIN — HEPARIN SODIUM 5000 UNIT(S): 5000 INJECTION INTRAVENOUS; SUBCUTANEOUS at 13:47

## 2019-10-15 RX ADMIN — Medication 1000 UNIT(S): at 18:04

## 2019-10-15 RX ADMIN — Medication 650 MILLIGRAM(S): at 19:22

## 2019-10-15 RX ADMIN — SODIUM CHLORIDE 125 MILLILITER(S): 9 INJECTION INTRAMUSCULAR; INTRAVENOUS; SUBCUTANEOUS at 13:47

## 2019-10-15 RX ADMIN — Medication 100 MILLIEQUIVALENT(S): at 09:04

## 2019-10-15 RX ADMIN — PIPERACILLIN AND TAZOBACTAM 200 GRAM(S): 4; .5 INJECTION, POWDER, LYOPHILIZED, FOR SOLUTION INTRAVENOUS at 13:46

## 2019-10-15 RX ADMIN — SODIUM CHLORIDE 750 MILLILITER(S): 9 INJECTION, SOLUTION INTRAVENOUS at 15:46

## 2019-10-15 RX ADMIN — HEPARIN SODIUM 5000 UNIT(S): 5000 INJECTION INTRAVENOUS; SUBCUTANEOUS at 21:46

## 2019-10-15 RX ADMIN — Medication 40 MILLIEQUIVALENT(S): at 18:05

## 2019-10-15 RX ADMIN — TAMOXIFEN CITRATE 20 MILLIGRAM(S): 20 TABLET, FILM COATED ORAL at 18:05

## 2019-10-15 RX ADMIN — Medication 650 MILLIGRAM(S): at 11:05

## 2019-10-15 RX ADMIN — PANTOPRAZOLE SODIUM 40 MILLIGRAM(S): 20 TABLET, DELAYED RELEASE ORAL at 13:47

## 2019-10-15 RX ADMIN — Medication 400 MILLIGRAM(S): at 02:04

## 2019-10-15 RX ADMIN — Medication 0.5 MILLIGRAM(S): at 02:34

## 2019-10-15 RX ADMIN — Medication 650 MILLIGRAM(S): at 18:18

## 2019-10-15 RX ADMIN — SODIUM CHLORIDE 1000 MILLILITER(S): 9 INJECTION, SOLUTION INTRAVENOUS at 12:10

## 2019-10-15 RX ADMIN — Medication 3 MILLILITER(S): at 20:59

## 2019-10-15 RX ADMIN — PIPERACILLIN AND TAZOBACTAM 25 GRAM(S): 4; .5 INJECTION, POWDER, LYOPHILIZED, FOR SOLUTION INTRAVENOUS at 21:46

## 2019-10-15 RX ADMIN — Medication 100 MILLIEQUIVALENT(S): at 06:42

## 2019-10-15 RX ADMIN — SODIUM CHLORIDE 125 MILLILITER(S): 9 INJECTION INTRAMUSCULAR; INTRAVENOUS; SUBCUTANEOUS at 03:27

## 2019-10-15 RX ADMIN — Medication 650 MILLIGRAM(S): at 22:31

## 2019-10-15 NOTE — CHART NOTE - NSCHARTNOTEFT_GEN_A_CORE
HPI:  64F with COPD (not on O2/steroids), Prediabetes (2017 a1c =5.6), Crohn's, hx of R breast cancer s/p mastectomy now on tamoxifen presented to  ED with concerns regarding 4 days of progressively worsening fever. Tmax of 104.1 on day of presentation. Motrin has been abating the fevers periodically. Last took Motrin at 4pm prior to  ED arrival.   Pt states she went to an Urgent Care the day prior to presentation and was given a course of amoxacillin for which she has had 3 doses.  Pt also states a frontal headache, nonradiating, 6-8/10 intensity that started earlier on today. Denies worsening of headache. Headache is intermittent and minimally response to OTC analgesics. Minimized PO intake in the setting of decreased appetite and feelings of "rumbling in my stomach". +fatigue in the setting of decreased PO intake, no dysuria, CP, SOB, BENSON, changes in vision, sore throat, cough, dysuria, or numbness/tingling in extremities.    Of note, Ms. Gomes was seen 10/9 in ED w/complaints of abdominal pain. +UA in the setting of many epithelial cells; Ucx negative. CT abd/pelvis w/PO contrast w/o acute pathology symptoms improved after IVF and morphine. Pt was  d/c with instructions to follow up outpt with GI. Today, states abdominal pain has improved, she really came in for progressively increasing temps.     In the ED today, pt was given Tylenol, heparin SQ for DVT ppx,  vancomycin x1, 1750cc bolus x1 and started on 125cc/hr fluids. (15 Oct 2019 02:38)    Subjective: Pt reports to be feeling better as she is in between fevers, she is well perfused with stable BPs. Capillary refill brisk and under 2 sec, skin looks to be well perfused. Lactate has resolved to <2.     Pt seen and examined at bedside    Vital Signs Last 24 Hrs  T(C): 39 (15 Oct 2019 18:00), Max: 39.2 (15 Oct 2019 10:59)  T(F): 102.2 (15 Oct 2019 18:00), Max: 102.6 (15 Oct 2019 10:59)  HR: 87 (15 Oct 2019 18:00) (66 - 103)  BP: 137/67 (15 Oct 2019 18:00) (100/52 - 143/101)  BP(mean): 73 (14 Oct 2019 18:42) (73 - 73)  RR: 18 (15 Oct 2019 18:00) (16 - 24)  SpO2: 100% (15 Oct 2019 13:42) (96% - 100%):    Physical exam  PHYSICAL EXAM:  Constitutional: NAD, awake and alert, well-developed  HEENT: PERR, EOMI, Normal Hearing, MMM  Neck: Soft and supple, No LAD, No JVD  Respiratory: Breath sounds are clear bilaterally, No wheezing, rales or rhonchi  Cardiovascular: S1 and S2, regular rate and rhythm, no Murmurs, gallops or rubs  Gastrointestinal: Bowel Sounds present, soft, +tender, nondistended, no guarding, no rebound LLQ illeostomy bag  Extremities: No peripheral edema  Vascular: 2+ peripheral pulses  Neurological: A/O x 3, no focal deficits  Musculoskeletal: 5/5 strength b/l upper and lower extremities  Skin: No rashes      Assessment  Pt being re-assessed for Sepsis  Plan  -Monitor for signs of sepsis

## 2019-10-15 NOTE — H&P ADULT - HISTORY OF PRESENT ILLNESS
64F with COPD (not on O2/steroids), Crohn's, hx of R breast cancer s/p masectomy now on tamoxifen presented to  ED with concerns regarding 4 days of progressively worsening fever. Tmax of 104.1 on day of presentation. Motrin has been abating the fevers periodically. Last took Motrin at 4pm prior to  ED arrival.   Pt states she went to an Urgent Care the day prior to presentation and was given a course of amoxocillin for which she has had 3 doses.  Pt also states a frontal headache, nonradiating, 6-8/10 intensity that started earlier on today. Denies worsening of headache. Headache is intermittent and minimally response to OTC analgeics. Minimized PO intake in the setting of decreased appetite and feelings of "rumbling in my stomach". 64F with COPD (not on O2/steroids), Crohn's, hx of R breast cancer s/p mastectomy now on tamoxifen presented to  ED with concerns regarding 4 days of progressively worsening fever. Tmax of 104.1 on day of presentation. Motrin has been abating the fevers periodically. Last took Motrin at 4pm prior to  ED arrival.   Pt states she went to an Urgent Care the day prior to presentation and was given a course of amoxacillin for which she has had 3 doses.  Pt also states a frontal headache, nonradiating, 6-8/10 intensity that started earlier on today. Denies worsening of headache. Headache is intermittent and minimally response to OTC analgesics. Minimized PO intake in the setting of decreased appetite and feelings of "rumbling in my stomach". +fatigue in the setting of decreased PO intake, no dysuria, CP, SOB, BENSON, changes in vision, sore throat, cough, dysuria, or numbness/tingling in extremities.      Of note, Ms. Gomes was seen 10/9 in ED w/complaints of abdominal pain. +UA in the setting of many epithelial cells; Ucx negative. CT abd/pelvis w/PO contrast w/o acute pathology symptoms improved after IVF and morphine. Pt was  d/c with instructions to follow up outpt with GI. Today, states abdominal pain has improved, she really came in for progressively increasing temps.     In the ED today, pt was given Tylenol, heparin SQ for DVT ppx,  vancomycin x1, 1750cc bolus x1 and started on 125cc/hr fluids. 64F with COPD (not on O2/steroids), Prediabetes (2017 a1c =5.6), Crohn's, hx of R breast cancer s/p mastectomy now on tamoxifen presented to  ED with concerns regarding 4 days of progressively worsening fever. Tmax of 104.1 on day of presentation. Motrin has been abating the fevers periodically. Last took Motrin at 4pm prior to  ED arrival.   Pt states she went to an Urgent Care the day prior to presentation and was given a course of amoxacillin for which she has had 3 doses.  Pt also states a frontal headache, nonradiating, 6-8/10 intensity that started earlier on today. Denies worsening of headache. Headache is intermittent and minimally response to OTC analgesics. Minimized PO intake in the setting of decreased appetite and feelings of "rumbling in my stomach". +fatigue in the setting of decreased PO intake, no dysuria, CP, SOB, BENSON, changes in vision, sore throat, cough, dysuria, or numbness/tingling in extremities.      Of note, Ms. Gomes was seen 10/9 in ED w/complaints of abdominal pain. +UA in the setting of many epithelial cells; Ucx negative. CT abd/pelvis w/PO contrast w/o acute pathology symptoms improved after IVF and morphine. Pt was  d/c with instructions to follow up outpt with GI. Today, states abdominal pain has improved, she really came in for progressively increasing temps.     In the ED today, pt was given Tylenol, heparin SQ for DVT ppx,  vancomycin x1, 1750cc bolus x1 and started on 125cc/hr fluids. 64F with COPD (not on O2/steroids), Prediabetes (2017 a1c =5.6), Crohn's, hx of R breast cancer s/p mastectomy now on tamoxifen presented to  ED with concerns regarding 4 days of progressively worsening fever. Tmax of 104.1 on day of presentation. Motrin has been abating the fevers periodically. Last took Motrin at 4pm prior to  ED arrival.   Pt states she went to an Urgent Care the day prior to presentation and was given a course of amoxacillin for which she has had 3 doses.  Pt also states a frontal headache, nonradiating, 6-8/10 intensity that started earlier on today. Denies worsening of headache. Headache is intermittent and minimally response to OTC analgesics. Minimized PO intake in the setting of decreased appetite and feelings of "rumbling in my stomach". +fatigue in the setting of decreased PO intake, no dysuria, CP, SOB, BENSON, changes in vision, sore throat, cough, dysuria, or numbness/tingling in extremities.    Of note, Ms. Gomes was seen 10/9 in ED w/complaints of abdominal pain. +UA in the setting of many epithelial cells; Ucx negative. CT abd/pelvis w/PO contrast w/o acute pathology symptoms improved after IVF and morphine. Pt was  d/c with instructions to follow up outpt with GI. Today, states abdominal pain has improved, she really came in for progressively increasing temps.     In the ED today, pt was given Tylenol, heparin SQ for DVT ppx,  vancomycin x1, 1750cc bolus x1 and started on 125cc/hr fluids.

## 2019-10-15 NOTE — H&P ADULT - ATTENDING COMMENTS
17 min of time spent discussing advanced care planning including code status, FULL CODE. MOLST signed.

## 2019-10-15 NOTE — H&P ADULT - NSHPOUTPATIENTPROVIDERS_GEN_ALL_CORE
PCP: Dr. Norman Guerra: Dr. Junior  GI: Dr. Wei Cabrera MD  Heme/Onc: Dr. Sanchez PCP: Dr. Norman Guerra: Dr. Junior  GI: Dr. Wei Cabrera MD  Heme/Onc: Dr. Sanchez  OB/gyn: Dr. Lee

## 2019-10-15 NOTE — H&P ADULT - NSICDXPASTMEDICALHX_GEN_ALL_CORE_FT
PAST MEDICAL HISTORY:  Breast cancer     COPD without exacerbation     Gastritis     H/o Lyme disease PATIENT STATED THAT SHE WAS DIAGNOSED IN SEPT 2014      Osteopenia     S/P radiation therapy Hx of radiation therapy 1991  rt breast

## 2019-10-15 NOTE — PROGRESS NOTE ADULT - ASSESSMENT
64F with a PMH significant for COPD and total colectomy S/P ileostomy who presented to [facility] with a 1 week history of persistent fevers and abdominal pain admitted for community acquired pneumonia.       #Fever of unknown origin complicated by SIRS in the setting of being immunocompromised. 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

## 2019-10-15 NOTE — CHART NOTE - NSCHARTNOTEFT_GEN_A_CORE
SEAN Reading of CT scans reviewed.    "mild bilateral lower lobe air space opacity-atelectasis/scarring"    PLAN:  Treat for CAP: ceftriaxone and azithromycin  - Trend temps and WBC  - Consider ID consult if fevers do not resolve    Discussed w/Dr. Carreon

## 2019-10-15 NOTE — H&P ADULT - NSHPPHYSICALEXAM_GEN_ALL_CORE
Vital Signs Last 24 Hrs  T(C): 37.8 (15 Oct 2019 03:18), Max: 39.1 (15 Oct 2019 01:41)  T(F): 100 (15 Oct 2019 03:18), Max: 102.4 (15 Oct 2019 01:41)  HR: 81 (15 Oct 2019 03:18) (66 - 103)  BP: 100/52 (15 Oct 2019 03:18) (100/52 - 143/101)  BP(mean): 73 (14 Oct 2019 18:42) (73 - 73)  RR: 18 (15 Oct 2019 03:18) (16 - 24)  SpO2: 96% (15 Oct 2019 03:18) (96% - 99%)    PHYSICAL EXAM:  Constitutional: NAD, awake and alert, well-developed  HEENT: PERR, Normal Hearing, dry mucous membranes  Neck: Soft and supple, No LAD  Respiratory: RLL with crackles, No wheezing  Cardiovascular: S1 and S2, regular rate and rhythm, 2/6 murmur  Gastrointestinal: Bowel Sounds present, soft, nontender, nondistended, no guarding, no rebound, +L ostomy bag  Extremities: No peripheral edema  Vascular: 2+ peripheral pulses  Neurological: A/O x 3, no focal deficits  Musculoskeletal: 5/5 strength b/l upper and lower extremities  Skin: No rashes      - Mental Status:  AAOx3; speech is fluent  - Cranial Nerves II-XII:    II:  PERRLA; visual fields are full to confrontation  III, IV, VI:  EOMI, no nystagmus  V:  facial sensation is intact in the V1-V3 distribution bilaterally.  VII:  face is symmetric with normal eye closure and smile  VIII:  hearing is intact to finger rub  IX, X:  uvula is midline and soft palate rises symmetrically  XI:  head turning and shoulder shrug are intact bilaterally  XII:  tongue protrudes in the midline  - Motor:  strength is 5/5 throughout; normal muscle bulk and tone throughout; no pronator drift  - Coordination:  finger-nose-finger and heel-knee-shin intact without dysmetria Vital Signs Last 24 Hrs  T(C): 37.8 (15 Oct 2019 03:18), Max: 39.1 (15 Oct 2019 01:41)  T(F): 100 (15 Oct 2019 03:18), Max: 102.4 (15 Oct 2019 01:41)  HR: 81 (15 Oct 2019 03:18) (66 - 103)  BP: 100/52 (15 Oct 2019 03:18) (100/52 - 143/101)  BP(mean): 73 (14 Oct 2019 18:42) (73 - 73)  RR: 18 (15 Oct 2019 03:18) (16 - 24)  SpO2: 96% (15 Oct 2019 03:18) (96% - 99%)    PHYSICAL EXAM:  Constitutional: NAD, awake and alert, well-developed  HEENT: PERR, Normal Hearing, dry mucous membranes, poor dentition  Neck: Soft and supple, No LAD  Respiratory: RLL with crackles, No wheezing  Cardiovascular: S1 and S2, regular rate and rhythm, 2/6 murmur  Gastrointestinal: Bowel Sounds present, soft, nontender, nondistended, no guarding, no rebound, +L ostomy bag  Extremities: No peripheral edema  Vascular: 2+ peripheral pulses  Neurological: A/O x 3, no focal deficits  Musculoskeletal: 5/5 strength b/l upper and lower extremities  Skin: No rashes      - Mental Status:  AAOx3; speech is fluent  - Cranial Nerves II-XII:    II:  PERRLA; visual fields are full to confrontation  III, IV, VI:  EOMI, no nystagmus  V:  facial sensation is intact in the V1-V3 distribution bilaterally.  VII:  face is symmetric with normal eye closure and smile  VIII:  hearing is intact to finger rub  IX, X:  uvula is midline and soft palate rises symmetrically  XI:  head turning and shoulder shrug are intact bilaterally  XII:  tongue protrudes in the midline  - Motor:  strength is 5/5 throughout; normal muscle bulk and tone throughout; no pronator drift  - Coordination:  finger-nose-finger and heel-knee-shin intact without dysmetria

## 2019-10-15 NOTE — ED ADULT NURSE REASSESSMENT NOTE - NS ED NURSE REASSESS COMMENT FT1
Upon entering the room for getting RVP sample, pt found shivering, stating "I have the chills, I can't get warm and I think I have a fever this is how it starts." Pt checked and has a oral temp of 102.4, pt unable to get rectal temps due to no rectum. Pt , other VS stable. Dr Carreon notified of current VS. awaiting orders.

## 2019-10-15 NOTE — PROGRESS NOTE ADULT - SUBJECTIVE AND OBJECTIVE BOX
Pt has been seen and examined with FP resident, resident supervised agree with a/p       Patient is a 64y old  Female who presents with a chief complaint of Persistent Fevers (15 Oct 2019 02:38)        HPI:  64F with COPD (not on O2/steroids), Prediabetes (2017 a1c =5.6), Crohn's, hx of R breast cancer s/p mastectomy now on tamoxifen presented to  ED with concerns regarding 4 days of progressively worsening fever      PHYSICAL EXAM:  Vital Signs Last 24 Hrs  T(C): 37.7 (15 Oct 2019 13:42), Max: 39.2 (15 Oct 2019 10:59)  T(F): 99.8 (15 Oct 2019 13:42), Max: 102.6 (15 Oct 2019 10:59)  HR: 75 (15 Oct 2019 13:42) (66 - 103)  BP: 108/50 (15 Oct 2019 13:42) (100/52 - 143/101)  BP(mean): 73 (14 Oct 2019 18:42) (73 - 73)  RR: 18 (15 Oct 2019 13:42) (16 - 24)  SpO2: 100% (15 Oct 2019 13:42) (96% - 100%)  general- comfortable   -rs-aeeb,cta  -cvs-s1s2 normal   -p/a-soft, right upper/epigastric tenderness, guarding present, bs+  -extremity- no asymmetrical swelling noted   -cns- non focal         A/P    #Sepsis -? etiology   -US of abdomen stat to follow, if negative do CT abdomen   -MRI   -change abx to zosyn   -clear liquid diet for now   #Above discussed with pt and all questions have been answered

## 2019-10-15 NOTE — H&P ADULT - ASSESSMENT
64F with COPD (not on O2/steroids), Prediabetes (2017 a1c =5.6), Crohn's, hx of R breast cancer s/p mastectomy now on tamoxifen presented to  ED with concerns regarding 4 days of progressively worsening fever. Tmax of 104.1 on day of presentation. In the setting of being immunocompromised 2/2 tamoxifen use, pt was admitted for fever of unknown origin.    #Fever of unknown origin complicated by SIRS in the setting of being immunocompromised. Could be 2/2 viral gastritis vs viral URI/LRI  - Admit to med surg  - f/u blood and urine cultures  - CT abd/pelvis  - NO RECTAL TEMPERATURES in the setting of Status post total proctocolectomy  - Chest CT  - IVF at 125cc/hr for 24 hrs  - Strict I/Os  - Consider Broad spectrum IV abx, Vanco/Zosyn    #Hx of  recurrent Breast cancer s/p R mastectomy  - Continue Tamoxifen   - CT head to assess for mets in the setting of headache w/nausea and recurrent breast cancer      #SHEA complicated by hyaline casts and Proteinuria. Could be prerenal intrarenal in the setting of NSAID use  - Continue IVF at 125cc/hr for 24 hrs  - Trend RFTs  - Renal dosing of abx    #Hypokalemia  - Replete  - trend BMP and replete PRN  - Check Mag    #Prediabetes (2017 a1c =5.6)  - BGMs qAC  - Serum a1c  - Will hold off on ISS pending a1c    #Osteopenia  - Continue vitamin D  - Outpt f/u    #DVT ppx  - Heparin sub Q in the setting of tamoxifen use and limited mobility due to malaise    #Advanced Care Planning <20mins  - Code Status: 64F with COPD (not on O2/steroids), Prediabetes (2017 a1c =5.6), Crohn's, hx of R breast cancer s/p mastectomy now on tamoxifen presented to  ED with concerns regarding 4 days of progressively worsening fever. Tmax of 104.1 on day of presentation. In the setting of being immunocompromised 2/2 tamoxifen use, pt was admitted for fever of unknown origin.    #Fever of unknown origin complicated by SIRS in the setting of being immunocompromised. Could be 2/2 viral gastritis vs viral URI/LRI  - Admit to med surg  - f/u blood and urine cultures  - RVP negative  - Consider CT abd/pelvis  - UDS  - NO RECTAL TEMPERATURES in the setting of Status post total proctocolectomy  - Chest CT  - IVF at 125cc/hr for 24 hrs  - Strict I/Os  - Consider Broad spectrum IV abx, Vanco/Zosyn    #Hx of  recurrent Breast cancer s/p R mastectomy  - Continue Tamoxifen   - CT head to assess for mets in the setting of headache w/nausea and recurrent breast cancer      #SHEA complicated by hyaline casts and Proteinuria. Could be prerenal intrarenal in the setting of NSAID use  - Continue IVF at 125cc/hr for 24 hrs  - Trend RFTs  - Renal dosing of abx    #Hypokalemia  - Replete  - trend BMP and replete PRN  - Check Mag    #Prediabetes (2017 a1c =5.6)  - BGMs qAC  - Serum a1c  - Will hold off on ISS pending a1c    #Osteopenia  - Continue vitamin D  - Outpt f/u    #DVT ppx  - Heparin sub Q in the setting of tamoxifen use and limited mobility due to malaise    #Advanced Care Planning <20mins  - Code Status: 64F with COPD (not on O2/steroids), Prediabetes (2017 a1c =5.6), Crohn's, hx of R breast cancer s/p mastectomy now on tamoxifen presented to  ED with concerns regarding 4 days of progressively worsening fever. Tmax of 104.1 on day of presentation. In the setting of being immunocompromised 2/2 tamoxifen use, pt was admitted for fever of unknown origin.    #Fever of unknown origin complicated by SIRS in the setting of being immunocompromised. Could be 2/2 viral gastritis vs viral URI/LRI  - Admit to med surg  - f/u blood and urine cultures  - RVP negative  - Consider CT abd/pelvis  - UDS  - NO RECTAL TEMPERATURES in the setting of Status post total proctocolectomy  - Chest CT  - IVF at 125cc/hr for 24 hrs  - Strict I/Os  - Consider Broad spectrum IV abx, Vanco/Zosyn  - ID consult    #Hx of  recurrent Breast cancer s/p R mastectomy  - Continue Tamoxifen   - CT head to assess for mets in the setting of headache w/nausea and recurrent breast cancer      #SHEA complicated by hyaline casts and Proteinuria. Could be prerenal intrarenal in the setting of NSAID use  - Continue IVF at 125cc/hr for 24 hrs  - Trend RFTs  - Renal dosing of abx    #Hypokalemia  - Replete  - trend BMP and replete PRN  - Check Mag    #Prediabetes (2017 a1c =5.6)  - BGMs qAC  - Serum a1c  - Will hold off on ISS pending a1c    #Osteopenia  - Continue vitamin D  - Outpt f/u    #DVT ppx  - Heparin sub Q in the setting of tamoxifen use and limited mobility due to malaise    #Advanced Care Planning <20mins  - Code Status: FULL CODE, MOLST signed

## 2019-10-15 NOTE — H&P ADULT - NSHPREVIEWOFSYSTEMS_GEN_ALL_CORE
REVIEW OF SYSTEMS:  CONSTITUTIONAL: +fatigue, +fevers +chills  EYES/ENT: No visual changes;  No vertigo or throat pain   NECK: No pain or stiffness  RESPIRATORY: No cough, wheezing, hemoptysis; No shortness of breath  CARDIOVASCULAR: No chest pain or palpitations  GASTROINTESTINAL: +epigastric pain. +nausea, no vomiting, or hematemesis  GENITOURINARY: No dysuria  NEUROLOGICAL: No numbness or weakness  All other review of systems is negative unless indicated above

## 2019-10-15 NOTE — PROGRESS NOTE ADULT - SUBJECTIVE AND OBJECTIVE BOX
64F with a PMH significant for COPD and total protocolectomy S/P ileostomy who presented to [facility] with a 1 week history of persistent fevers and abdominal pain. Upon arrival to the ED she was found to be febrile (T 102.4F) and tachycardic(103), meeting the criteria for sepsis. The protocol was initiated, Cultures were drawn, she was given a dose of Vancomycin and IVFs were initiated. RVP/CT Head was negative, CXR was non conclusive, however CT chest revealed mild bilateral lower lobe opacities. She was then admitted to the floor for (((HAP vs CAP))). In addition to the primary medical team she was followed by .... On the floor she became septic again, managed on the protocol.          Subjective: Pt was seen and examined at bedside this afternoon in the presence of her . Pt reports fevers and chills as well as mild epigastric pain. Pt denies cough, SOB, sore through. She reports a mild headache that remits with the ingestion of analgesics. She also denies diarrhea Remaining ROS is unremarkable.     MEDICATIONS  (STANDING):  ALBUTerol/ipratropium for Nebulization 3 milliLiter(s) Nebulizer every 6 hours  buDESOnide  80 MICROgram(s)/formoterol 4.5 MICROgram(s) Inhaler 2 Puff(s) Inhalation two times a day  cholecalciferol 1000 Unit(s) Oral daily  heparin  Injectable 5000 Unit(s) SubCutaneous every 8 hours  influenza   Vaccine 0.5 milliLiter(s) IntraMuscular once  pantoprazole  Injectable 40 milliGRAM(s) IV Push daily  piperacillin/tazobactam IVPB.. 3.375 Gram(s) IV Intermittent every 8 hours  potassium chloride    Tablet ER 40 milliEquivalent(s) Oral once  sodium chloride 0.9%. 1000 milliLiter(s) (125 mL/Hr) IV Continuous <Continuous>  tamoxifen 20 milliGRAM(s) Oral daily    MEDICATIONS  (PRN):  ondansetron Injectable 4 milliGRAM(s) IV Push every 6 hours PRN Nausea      Vital Signs Last 24 Hrs  T(C): 37.7 (15 Oct 2019 13:42), Max: 39.2 (15 Oct 2019 10:59)  T(F): 99.8 (15 Oct 2019 13:42), Max: 102.6 (15 Oct 2019 10:59)  HR: 75 (15 Oct 2019 13:42) (66 - 103)  BP: 108/50 (15 Oct 2019 13:42) (100/52 - 143/101)  BP(mean): 73 (14 Oct 2019 18:42) (73 - 73)  RR: 18 (15 Oct 2019 13:42) (16 - 24)  SpO2: 100% (15 Oct 2019 13:42) (96% - 100%)    GEN: NAD, comfortable, resting in bed  HEENT: NC/AT, EOMI, PERRLA, MMM, clear conjunctiva and sclera, normal hearing, no nasal discharge, throat clear, no thrush, normal dentition.   NECK: supple, no JVD, no LAD, no thyromegaly  BACK:  ROM intact, no spinal/paraspinal tenderness  CV: S1S2, RRR, no mumur  RESP: good air movement, CTABL, no rales, rhonchi or wheezing, respirations unlabored  ABD: +BS, soft, ND, +tenderness to palpation of the epigastric region, no guarding, no rigidity, no HSM, +LLQ ileostomy bag no drainage, erythema surrounding bag  EXT: +2 radial and pedal pulses, no edema, no calve tenderness  SKIN: No visible Rashes or Ulcers  MSK: ROM intact in all extremities  NEURO:  sensory and CN 2-12 Grossly intact, no motor deficits, no, saddle anesthesia, AOx3  PSYCH: normal behavior   =  LABS:                   10.4   5.18  )-----------( 152      ( 15 Oct 2019 07:28 )             31.1     15 Oct 2019 07:28    141    |  112    |  18     ----------------------------<  107    3.2     |  19     |  1.04     Ca    7.9        15 Oct 2019 07:28  Phos  2.7       15 Oct 2019 07:28  Mg     1.7       15 Oct 2019 07:28    TPro  6.0    /  Alb  2.5    /  TBili  0.5    /  DBili  x      /  AST  30     /  ALT  26     /  AlkPhos  44     15 Oct 2019 07:28    LIVER FUNCTIONS - ( 15 Oct 2019 07:28 )  Alb: 2.5 g/dL / Pro: 6.0 gm/dL / ALK PHOS: 44 U/L / ALT: 26 U/L / AST: 30 U/L / GGT: x           PT/INR - ( 14 Oct 2019 20:57 )   PT: 11.5 sec;   INR: 1.03 ratio         PTT - ( 14 Oct 2019 20:57 )  PTT:28.7 sec  CAPILLARY BLOOD GLUCOSE      POCT Blood Glucose.: 113 mg/dL (15 Oct 2019 11:59)  POCT Blood Glucose.: 123 mg/dL (15 Oct 2019 08:04)  POCT Blood Glucose.: 159 mg/dL (15 Oct 2019 02:09)        Urinalysis Basic - ( 14 Oct 2019 22:45 )    Color: Yellow / Appearance: Clear / S.010 / pH: x  Gluc: x / Ketone: Negative  / Bili: Negative / Urobili: Negative mg/dL   Blood: x / Protein: 100 mg/dL / Nitrite: Negative   Leuk Esterase: Negative / RBC: 0-2 /HPF / WBC 3-5   Sq Epi: x / Non Sq Epi: Few / Bacteria: Few    RADIOLOGY:  < from: Xray Chest 1 View-PORTABLE IMMEDIATE (10.14.19 @ 21:39) >  IMPRESSION:    Right axillary surgical clips.   The lungs are clear.  No pleural abnormality is seen, however the left   costophrenic angle is cut off from view.    Cardiomediastinal silhouette is intact.  < from: CT Head No Cont (10.15.19 @ 03:16) >  IMPRESSION:   1. No evidence of acute large vessel infarction.   2. No evidence of acute intracranial hemorrhage, extraaxial fluid or   midline shift.   3. Mild cerebral atrophy.   4. 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  < from: CT Chest No Cont (10.15.19 @ 03:17) >  IMPRESSION:   No evidence of pneumonia.  Emphysema and old granulomatous disease.  Small pulmonary nodules largest measuring 4 mm in the right lower lobe.   Follow-up noncontrast low-dose CT scan of the chest in 12 months is   recommended.  < from: US Abdomen Complete (10.15.19 @ 13:08) >  Fatty infiltration of the liver.  Cholelithiasis, without acute cholecystitis or biliary ductal dilatation.

## 2019-10-15 NOTE — H&P ADULT - NSICDXFAMILYHX_GEN_ALL_CORE_FT
FAMILY HISTORY:  Family history of obesity  Family hx of prostate cancer  FH: heart disease  FHx: diabetes mellitus  Paternal family history of emphysema

## 2019-10-15 NOTE — CHART NOTE - NSCHARTNOTEFT_GEN_A_CORE
Pt noted to meet sepsis criteria at bedside this morning during rounds around 1140am. Pt spiked fever to 102.6  F, tachycardic to 102 and BP of 103/80. Pt was given Tylenol by RN. Pt was tearful and notes having a mild headache and some epigastric discomfort irrespective of meals. She has not had much of an appetite lately. Pt was admitted last night for sepsis secondary to pneumonia and was started on Ceftriaxone and azithromycin last night. CXR overnight noted for mild bilateral lower lobe air space opacity-atelectasis/scarring. Pt  also at bedside has no additional questions or concerns at this time.    Physical Exam  Gen: middle aged woman in mild distress due to fever and headache, tearful  HEENT: EOMI, PERRL, crusting noted in bilateral nares  Cardio: S1 S2 tachycardic  Pulm: mild bibasilar crackles  Abd: epigastrium tender to palpation, colostomy bag in place on LLQ, old surgical scars noted  Extr: No pedal edema or calf tenderness     A/P: 61 yo F admitted for sepsis 2/2 CAP with resolution of sepsis overnight now meeting sepsis criteria with fever and tachycardia. ROS noted for headache and epigastric discomfort . Physical exam significant for epigastric tenderness. CXR as described above, possibly atelectasis due to decreased inspiration due to underlying epigastric discomfort.    - STAT 1L bolus LR   - Will give an additional bolus of 750 cc  - STAT Lactate  - STAT IV Zosyn TID  - Discontinued Ceftriaxone   - STAT Abdominal US to rule out biliary etiology  - MRCP if Abdominal US inconclusive    Susi Byrd MD PGY3  Pt seen and discussed with Dr. Dougherty. Agrees with the above plan. Plan discussed with pt, pt  and KARYN Coleman.

## 2019-10-15 NOTE — H&P ADULT - NSHPLABSRESULTS_GEN_ALL_CORE
13.1   8.55  )-----------( 188      ( 14 Oct 2019 20:57 )             38.8     14 Oct 2019 20:57    135    |  102    |  24     ----------------------------<  99     3.2     |  21     |  1.42     Ca    9.4        14 Oct 2019 20:57    TPro  8.0    /  Alb  3.3    /  TBili  0.7    /  DBili  x      /  AST  35     /  ALT  32     /  AlkPhos  59     14 Oct 2019 20:57    LIVER FUNCTIONS - ( 14 Oct 2019 20:57 )  Alb: 3.3 g/dL / Pro: 8.0 gm/dL / ALK PHOS: 59 U/L / ALT: 32 U/L / AST: 35 U/L / GGT: x           PT/INR - ( 14 Oct 2019 20:57 )   PT: 11.5 sec;   INR: 1.03 ratio         PTT - ( 14 Oct 2019 20:57 )  PTT:28.7 sec  CAPILLARY BLOOD GLUCOSE      POCT Blood Glucose.: 159 mg/dL (15 Oct 2019 02:09)        Urinalysis Basic - ( 14 Oct 2019 22:45 )    Color: Yellow / Appearance: Clear / S.010 / pH: x  Gluc: x / Ketone: Negative  / Bili: Negative / Urobili: Negative mg/dL   Blood: x / Protein: 100 mg/dL / Nitrite: Negative   Leuk Esterase: Negative / RBC: 0-2 /HPF / WBC 3-5   Sq Epi: x / Non Sq Epi: Few / Bacteria: Few        RADIOLOGY

## 2019-10-16 LAB
ANION GAP SERPL CALC-SCNC: 10 MMOL/L — SIGNIFICANT CHANGE UP (ref 5–17)
BUN SERPL-MCNC: 7 MG/DL — SIGNIFICANT CHANGE UP (ref 7–23)
CALCIUM SERPL-MCNC: 7.6 MG/DL — LOW (ref 8.5–10.1)
CHLORIDE SERPL-SCNC: 113 MMOL/L — HIGH (ref 96–108)
CMV IGM FLD-ACNC: <8 AU/ML — SIGNIFICANT CHANGE UP
CMV IGM SERPL QL: NEGATIVE — SIGNIFICANT CHANGE UP
CO2 SERPL-SCNC: 20 MMOL/L — LOW (ref 22–31)
CREAT SERPL-MCNC: 1.12 MG/DL — SIGNIFICANT CHANGE UP (ref 0.5–1.3)
CRP SERPL-MCNC: 10.09 MG/DL — HIGH (ref 0–0.4)
CULTURE RESULTS: NO GROWTH — SIGNIFICANT CHANGE UP
CULTURE RESULTS: SIGNIFICANT CHANGE UP
CULTURE RESULTS: SIGNIFICANT CHANGE UP
ERYTHROCYTE [SEDIMENTATION RATE] IN BLOOD: 48 MM/HR — HIGH (ref 0–20)
FERRITIN SERPL-MCNC: 1075 NG/ML — HIGH (ref 15–150)
GLUCOSE SERPL-MCNC: 105 MG/DL — HIGH (ref 70–99)
HCT VFR BLD CALC: 28.9 % — LOW (ref 34.5–45)
HGB BLD-MCNC: 9.7 G/DL — LOW (ref 11.5–15.5)
LACTATE SERPL-SCNC: 1.1 MMOL/L — SIGNIFICANT CHANGE UP (ref 0.7–2)
LDH SERPL L TO P-CCNC: 202 U/L — SIGNIFICANT CHANGE UP (ref 84–241)
LYME C6 AB IGG/IGM EIA REFLEX WESTERN BL: SIGNIFICANT CHANGE UP
MCHC RBC-ENTMCNC: 31.6 PG — SIGNIFICANT CHANGE UP (ref 27–34)
MCHC RBC-ENTMCNC: 33.6 GM/DL — SIGNIFICANT CHANGE UP (ref 32–36)
MCV RBC AUTO: 94.1 FL — SIGNIFICANT CHANGE UP (ref 80–100)
PLATELET # BLD AUTO: 143 K/UL — LOW (ref 150–400)
POTASSIUM SERPL-MCNC: 3 MMOL/L — LOW (ref 3.5–5.3)
POTASSIUM SERPL-SCNC: 3 MMOL/L — LOW (ref 3.5–5.3)
RBC # BLD: 3.07 M/UL — LOW (ref 3.8–5.2)
RBC # FLD: 13.7 % — SIGNIFICANT CHANGE UP (ref 10.3–14.5)
SODIUM SERPL-SCNC: 143 MMOL/L — SIGNIFICANT CHANGE UP (ref 135–145)
SPECIMEN SOURCE: SIGNIFICANT CHANGE UP
T GONDII IGM SER QL: <3 AU/ML — SIGNIFICANT CHANGE UP
T GONDII IGM SER QL: NEGATIVE — SIGNIFICANT CHANGE UP
WBC # BLD: 4.15 K/UL — SIGNIFICANT CHANGE UP (ref 3.8–10.5)
WBC # FLD AUTO: 4.15 K/UL — SIGNIFICANT CHANGE UP (ref 3.8–10.5)

## 2019-10-16 RX ORDER — IBUPROFEN 200 MG
600 TABLET ORAL ONCE
Refills: 0 | Status: COMPLETED | OUTPATIENT
Start: 2019-10-16 | End: 2019-10-16

## 2019-10-16 RX ORDER — POTASSIUM CHLORIDE 20 MEQ
40 PACKET (EA) ORAL ONCE
Refills: 0 | Status: COMPLETED | OUTPATIENT
Start: 2019-10-16 | End: 2019-10-16

## 2019-10-16 RX ORDER — SODIUM CHLORIDE 9 MG/ML
1000 INJECTION INTRAMUSCULAR; INTRAVENOUS; SUBCUTANEOUS
Refills: 0 | Status: DISCONTINUED | OUTPATIENT
Start: 2019-10-16 | End: 2019-10-16

## 2019-10-16 RX ORDER — ALPRAZOLAM 0.25 MG
0.25 TABLET ORAL ONCE
Refills: 0 | Status: DISCONTINUED | OUTPATIENT
Start: 2019-10-16 | End: 2019-10-16

## 2019-10-16 RX ORDER — IBUPROFEN 200 MG
400 TABLET ORAL ONCE
Refills: 0 | Status: DISCONTINUED | OUTPATIENT
Start: 2019-10-16 | End: 2019-10-16

## 2019-10-16 RX ORDER — SODIUM CHLORIDE 9 MG/ML
1000 INJECTION INTRAMUSCULAR; INTRAVENOUS; SUBCUTANEOUS
Refills: 0 | Status: DISCONTINUED | OUTPATIENT
Start: 2019-10-16 | End: 2019-10-17

## 2019-10-16 RX ORDER — ACETAMINOPHEN 500 MG
1000 TABLET ORAL ONCE
Refills: 0 | Status: COMPLETED | OUTPATIENT
Start: 2019-10-16 | End: 2019-10-16

## 2019-10-16 RX ADMIN — SODIUM CHLORIDE 125 MILLILITER(S): 9 INJECTION INTRAMUSCULAR; INTRAVENOUS; SUBCUTANEOUS at 16:36

## 2019-10-16 RX ADMIN — Medication 600 MILLIGRAM(S): at 16:39

## 2019-10-16 RX ADMIN — TAMOXIFEN CITRATE 20 MILLIGRAM(S): 20 TABLET, FILM COATED ORAL at 11:10

## 2019-10-16 RX ADMIN — Medication 600 MILLIGRAM(S): at 05:00

## 2019-10-16 RX ADMIN — Medication 650 MILLIGRAM(S): at 11:38

## 2019-10-16 RX ADMIN — BUDESONIDE AND FORMOTEROL FUMARATE DIHYDRATE 2 PUFF(S): 160; 4.5 AEROSOL RESPIRATORY (INHALATION) at 07:28

## 2019-10-16 RX ADMIN — HEPARIN SODIUM 5000 UNIT(S): 5000 INJECTION INTRAVENOUS; SUBCUTANEOUS at 05:02

## 2019-10-16 RX ADMIN — HEPARIN SODIUM 5000 UNIT(S): 5000 INJECTION INTRAVENOUS; SUBCUTANEOUS at 13:53

## 2019-10-16 RX ADMIN — Medication 1000 UNIT(S): at 11:09

## 2019-10-16 RX ADMIN — PANTOPRAZOLE SODIUM 40 MILLIGRAM(S): 20 TABLET, DELAYED RELEASE ORAL at 11:09

## 2019-10-16 RX ADMIN — Medication 3 MILLILITER(S): at 13:04

## 2019-10-16 RX ADMIN — SODIUM CHLORIDE 75 MILLILITER(S): 9 INJECTION INTRAMUSCULAR; INTRAVENOUS; SUBCUTANEOUS at 04:28

## 2019-10-16 RX ADMIN — Medication 600 MILLIGRAM(S): at 06:47

## 2019-10-16 RX ADMIN — HEPARIN SODIUM 5000 UNIT(S): 5000 INJECTION INTRAVENOUS; SUBCUTANEOUS at 21:50

## 2019-10-16 RX ADMIN — Medication 400 MILLIGRAM(S): at 02:30

## 2019-10-16 RX ADMIN — PIPERACILLIN AND TAZOBACTAM 25 GRAM(S): 4; .5 INJECTION, POWDER, LYOPHILIZED, FOR SOLUTION INTRAVENOUS at 05:01

## 2019-10-16 RX ADMIN — Medication 600 MILLIGRAM(S): at 16:35

## 2019-10-16 RX ADMIN — SODIUM CHLORIDE 125 MILLILITER(S): 9 INJECTION INTRAMUSCULAR; INTRAVENOUS; SUBCUTANEOUS at 00:03

## 2019-10-16 RX ADMIN — Medication 3 MILLILITER(S): at 07:28

## 2019-10-16 RX ADMIN — Medication 0.25 MILLIGRAM(S): at 17:42

## 2019-10-16 RX ADMIN — Medication 40 MILLIEQUIVALENT(S): at 08:53

## 2019-10-16 RX ADMIN — Medication 1000 MILLIGRAM(S): at 03:13

## 2019-10-16 RX ADMIN — Medication 650 MILLIGRAM(S): at 11:52

## 2019-10-16 NOTE — CONSULT NOTE ADULT - SUBJECTIVE AND OBJECTIVE BOX
Patient is a 64y old  Female who presents with a chief complaint of Persistent Fevers (16 Oct 2019 08:58)    HPI:  63 y/o Female with h/o COPD (not on O2/steroids), pre-diabetes, Crohn's disease, right breast cancer s/p mastectomy on tamoxifen was admitted on 10/14 for fever x 4 days. Febrile to Tmax of 104.1 on day of presentation. Motrin has been abating the fever. Pt states she went to an Urgent Care the day prior to presentation and was given a course of amoxacillin for which she has had 3 doses. She has headache, nonradiating, 6-8/10 intensity x one day. Has decreased appetite, feelings of "rumbling in my stomach", fatigue. In ER she received vancomycin IV and zosyn.    PMH: as above  She was seen 10/9 in ED w/complaints of abdominal pain. +UA in the setting of many epithelial cells; Ucx negative. CT abd/pelvis w/PO contrast w/o acute pathology symptoms improved after IVF and morphine. Pt was  d/c with instructions to follow up outpt with GI.  PSH: as above  Meds: per reconciliation sheet, noted below  MEDICATIONS  (STANDING):  ALBUTerol/ipratropium for Nebulization 3 milliLiter(s) Nebulizer every 6 hours  buDESOnide  80 MICROgram(s)/formoterol 4.5 MICROgram(s) Inhaler 2 Puff(s) Inhalation two times a day  cholecalciferol 1000 Unit(s) Oral daily  heparin  Injectable 5000 Unit(s) SubCutaneous every 8 hours  influenza   Vaccine 0.5 milliLiter(s) IntraMuscular once  pantoprazole  Injectable 40 milliGRAM(s) IV Push daily  piperacillin/tazobactam IVPB.. 3.375 Gram(s) IV Intermittent every 8 hours  sodium chloride 0.9%. 1000 milliLiter(s) (125 mL/Hr) IV Continuous <Continuous>  sodium chloride 0.9%. 1000 milliLiter(s) (125 mL/Hr) IV Continuous <Continuous>  tamoxifen 20 milliGRAM(s) Oral daily    MEDICATIONS  (PRN):  acetaminophen   Tablet .. 650 milliGRAM(s) Oral every 6 hours PRN Temp greater or equal to 38C (100.4F), Mild Pain (1 - 3)  ondansetron Injectable 4 milliGRAM(s) IV Push every 6 hours PRN Nausea    Allergies    IV Contrast (Unknown)  Phenergan (Unknown)    Intolerances      Social: no smoking, no alcohol, no illegal drugs; no recent travel, no exposure to TB  FAMILY HISTORY:  Family hx of prostate cancer  Paternal family history of emphysema  FHx: diabetes mellitus  Family history of obesity  FH: heart disease    no history of premature cardiovascular disease in first degree relatives    ROS: the patient denies seizures, no dizziness, no sore throat, no nasal congestion, no blurry vision, no CP, no palpitations, no SOB, no cough, had abdominal pain, no diarrhea, no N/V, no dysuria, no leg pain, no claudication, no rash, no joint aches, no rectal pain or bleeding, no night sweats  All other systems reviewed and are negative    Vital Signs Last 24 Hrs  T(C): 37.1 (16 Oct 2019 06:14), Max: 39.2 (15 Oct 2019 10:59)  T(F): 98.7 (16 Oct 2019 06:14), Max: 102.6 (15 Oct 2019 10:59)  HR: 82 (16 Oct 2019 07:30) (64 - 102)  BP: 108/57 (16 Oct 2019 06:14) (103/80 - 143/77)  BP(mean): --  RR: 18 (16 Oct 2019 06:14) (18 - 18)  SpO2: 96% (16 Oct 2019 06:14) (94% - 100%)  Daily     Daily     PE:    Constitutional: frail looking  HEENT: NC/AT, EOMI, PERRLA, conjunctivae clear; ears and nose atraumatic; pharynx benign  Neck: supple; thyroid not palpable  Back: no tenderness  Respiratory: respiratory effort normal; clear to auscultation  Cardiovascular: S1S2 regular, no murmurs  Abdomen: soft, not tender, not distended, positive BS; no liver or spleen organomegaly  Genitourinary: no suprapubic tenderness  Lymphatic: no LN palpable  Musculoskeletal: no muscle tenderness, no joint swelling or tenderness  Extremities: no pedal edema  Neurological/ Psychiatric: AxOx3, judgement and insight normal; moving all extremities  Skin: no rashes; no palpable lesions    Labs: all available labs reviewed                        9.7    4.15  )-----------( 143      ( 16 Oct 2019 07:16 )             28.9     10-    143  |  113<H>  |  7   ----------------------------<  105<H>  3.0<L>   |  20<L>  |  1.12    Ca    7.6<L>      16 Oct 2019 07:16  Phos  2.7     10-15  Mg     1.7     10-15    TPro  6.0  /  Alb  2.5<L>  /  TBili  0.5  /  DBili  x   /  AST  30  /  ALT  26  /  AlkPhos  44  10-15     LIVER FUNCTIONS - ( 15 Oct 2019 07:28 )  Alb: 2.5 g/dL / Pro: 6.0 gm/dL / ALK PHOS: 44 U/L / ALT: 26 U/L / AST: 30 U/L / GGT: x           Urinalysis Basic - ( 14 Oct 2019 22:45 )    Color: Yellow / Appearance: Clear / S.010 / pH: x  Gluc: x / Ketone: Negative  / Bili: Negative / Urobili: Negative mg/dL   Blood: x / Protein: 100 mg/dL / Nitrite: Negative   Leuk Esterase: Negative / RBC: 0-2 /HPF / WBC 3-5   Sq Epi: x / Non Sq Epi: Few / Bacteria: Few          Radiology: all available radiological tests reviewed    Advanced directives addressed: full resuscitation Patient is a 64y old  Female who presents with a chief complaint of Persistent Fevers (16 Oct 2019 08:58)    HPI:  65 y/o Female with h/o COPD (not on O2/steroids), pre-diabetes, Crohn's disease, right breast cancer s/p mastectomy on tamoxifen was admitted on 10/14 for fever x 4 days. Febrile to Tmax of 104.1 on day of presentation. Motrin has been abating the fever. Pt states she went to an Urgent Care the day prior to presentation and was given a course of amoxacillin for which she has had 3 doses. She has headache, nonradiating, 6-8/10 intensity x one day. Has decreased appetite, feelings of "rumbling in my stomach", fatigue. In ER she received vancomycin IV and zosyn.    PMH: as above  She was seen 10/9 in ED w/complaints of abdominal pain. +UA in the setting of many epithelial cells; Ucx negative. CT abd/pelvis w/PO contrast w/o acute pathology symptoms improved after IVF and morphine. Pt was  d/c with instructions to follow up outpt with GI.  PSH: as above  Meds: per reconciliation sheet, noted below  MEDICATIONS  (STANDING):  ALBUTerol/ipratropium for Nebulization 3 milliLiter(s) Nebulizer every 6 hours  buDESOnide  80 MICROgram(s)/formoterol 4.5 MICROgram(s) Inhaler 2 Puff(s) Inhalation two times a day  cholecalciferol 1000 Unit(s) Oral daily  heparin  Injectable 5000 Unit(s) SubCutaneous every 8 hours  influenza   Vaccine 0.5 milliLiter(s) IntraMuscular once  pantoprazole  Injectable 40 milliGRAM(s) IV Push daily  piperacillin/tazobactam IVPB.. 3.375 Gram(s) IV Intermittent every 8 hours  sodium chloride 0.9%. 1000 milliLiter(s) (125 mL/Hr) IV Continuous <Continuous>  sodium chloride 0.9%. 1000 milliLiter(s) (125 mL/Hr) IV Continuous <Continuous>  tamoxifen 20 milliGRAM(s) Oral daily    MEDICATIONS  (PRN):  acetaminophen   Tablet .. 650 milliGRAM(s) Oral every 6 hours PRN Temp greater or equal to 38C (100.4F), Mild Pain (1 - 3)  ondansetron Injectable 4 milliGRAM(s) IV Push every 6 hours PRN Nausea    Allergies    IV Contrast (Unknown)  Phenergan (Unknown)    Intolerances      Social: no smoking, no alcohol, no illegal drugs; no recent travel, no exposure to TB  FAMILY HISTORY:  Family hx of prostate cancer  Paternal family history of emphysema  FHx: diabetes mellitus  Family history of obesity  FH: heart disease    no history of premature cardiovascular disease in first degree relatives    ROS: the patient denies seizures, no dizziness, no sore throat, no nasal congestion, no blurry vision, no CP, no palpitations, no SOB, no cough, had abdominal pain, no diarrhea, no N/V, no dysuria, no leg pain, no claudication, no rash, no joint aches, no rectal pain or bleeding, no night sweats  All other systems reviewed and are negative    Vital Signs Last 24 Hrs  T(C): 37.1 (16 Oct 2019 06:14), Max: 39.2 (15 Oct 2019 10:59)  T(F): 98.7 (16 Oct 2019 06:14), Max: 102.6 (15 Oct 2019 10:59)  HR: 82 (16 Oct 2019 07:30) (64 - 102)  BP: 108/57 (16 Oct 2019 06:14) (103/80 - 143/77)  BP(mean): --  RR: 18 (16 Oct 2019 06:14) (18 - 18)  SpO2: 96% (16 Oct 2019 06:14) (94% - 100%)  Daily     Daily     PE:    Constitutional: frail looking  HEENT: NC/AT, EOMI, PERRLA, conjunctivae clear; ears and nose atraumatic; pharynx benign  Neck: supple; thyroid not palpable  Back: no tenderness  Respiratory: respiratory effort normal; clear to auscultation  Cardiovascular: S1S2 regular, no murmurs  Abdomen: soft, not tender, not distended, positive BS; no liver or spleen organomegaly  Genitourinary: no suprapubic tenderness  Lymphatic: no LN palpable  Musculoskeletal: no muscle tenderness, no joint swelling or tenderness  Extremities: no pedal edema  Neurological/ Psychiatric: AxOx3, judgement and insight normal; moving all extremities  Skin: no rashes; no palpable lesions    Labs: all available labs reviewed                        9.7    4.15  )-----------( 143      ( 16 Oct 2019 07:16 )             28.9     10-    143  |  113<H>  |  7   ----------------------------<  105<H>  3.0<L>   |  20<L>  |  1.12    Ca    7.6<L>      16 Oct 2019 07:16  Phos  2.7     10-15  Mg     1.7     10-15    TPro  6.0  /  Alb  2.5<L>  /  TBili  0.5  /  DBili  x   /  AST  30  /  ALT  26  /  AlkPhos  44  10-15     LIVER FUNCTIONS - ( 15 Oct 2019 07:28 )  Alb: 2.5 g/dL / Pro: 6.0 gm/dL / ALK PHOS: 44 U/L / ALT: 26 U/L / AST: 30 U/L / GGT: x           Urinalysis Basic - ( 14 Oct 2019 22:45 )    Color: Yellow / Appearance: Clear / S.010 / pH: x  Gluc: x / Ketone: Negative  / Bili: Negative / Urobili: Negative mg/dL   Blood: x / Protein: 100 mg/dL / Nitrite: Negative   Leuk Esterase: Negative / RBC: 0-2 /HPF / WBC 3-5   Sq Epi: x / Non Sq Epi: Few / Bacteria: Few      Radiology: all available radiological tests reviewed    < from: MR Abdomen No Cont (10.15.19 @ 17:34) >  1. Poor distention of the stomach. Prominence of gastric folds which   could be   secondary to underdistention or gastritis.   2. Prior colectomy with left lower quadrant ileostomy.   3. Cholelithiasis. No obvious gallbladder edema. No evidence of biliary   obstruction.   4. Hepatomegaly with fatty infiltration of the liver.    < end of copied text >    < from: US Abdomen Complete (10.15.19 @ 13:08) >  Fatty infiltration of the liver.  Cholelithiasis, without acute cholecystitis or biliary ductal dilatation.    < end of copied text >      Advanced directives addressed: full resuscitation Patient is a 64y old  Female who presents with a chief complaint of Persistent Fevers (16 Oct 2019 08:58)    HPI:  65 y/o Female with h/o COPD (not on O2/steroids), pre-diabetes, Crohn's disease, right breast cancer s/p mastectomy on tamoxifen was admitted on 10/14 for fever x 4 days. Febrile to Tmax of 104.1 on day of presentation. Motrin has been abating the fever. Pt states she went to an Urgent Care the day prior to presentation and was given a course of amoxacillin for which she has had 3 doses. She has headache, nonradiating, 6-8/10 intensity x one day. Has decreased appetite, feelings of "rumbling in my stomach", fatigue. She was seen 10/9 in [facility] w/complaints of abdominal pain. +UA in the setting of many epithelial cells; Ucx negative. CT abd/pelvis w/PO contrast w/o acute pathology symptoms improved after IVF and morphine. Pt was  d/c with instructions to follow up outpt with GI. In ER she received vancomycin IV and zosyn.    PMH: as above  PSH: as above  Meds: per reconciliation sheet, noted below  MEDICATIONS  (STANDING):  ALBUTerol/ipratropium for Nebulization 3 milliLiter(s) Nebulizer every 6 hours  buDESOnide  80 MICROgram(s)/formoterol 4.5 MICROgram(s) Inhaler 2 Puff(s) Inhalation two times a day  cholecalciferol 1000 Unit(s) Oral daily  heparin  Injectable 5000 Unit(s) SubCutaneous every 8 hours  influenza   Vaccine 0.5 milliLiter(s) IntraMuscular once  pantoprazole  Injectable 40 milliGRAM(s) IV Push daily  piperacillin/tazobactam IVPB.. 3.375 Gram(s) IV Intermittent every 8 hours  sodium chloride 0.9%. 1000 milliLiter(s) (125 mL/Hr) IV Continuous <Continuous>  sodium chloride 0.9%. 1000 milliLiter(s) (125 mL/Hr) IV Continuous <Continuous>  tamoxifen 20 milliGRAM(s) Oral daily    MEDICATIONS  (PRN):  acetaminophen   Tablet .. 650 milliGRAM(s) Oral every 6 hours PRN Temp greater or equal to 38C (100.4F), Mild Pain (1 - 3)  ondansetron Injectable 4 milliGRAM(s) IV Push every 6 hours PRN Nausea    Allergies    IV Contrast (Unknown)  Phenergan (Unknown)    Intolerances      Social: no smoking, no alcohol, no illegal drugs; no recent travel, no exposure to TB  FAMILY HISTORY:  Family hx of prostate cancer  Paternal family history of emphysema  FHx: diabetes mellitus  Family history of obesity  FH: heart disease    no history of premature cardiovascular disease in first degree relatives    ROS: the patient denies seizures, no dizziness, no sore throat, no nasal congestion, no blurry vision, no CP, no palpitations, no SOB, no cough, has abdominal pain, no diarrhea, no N/V, no dysuria, no leg pain, no claudication, no rash, no joint aches, no rectal pain or bleeding, no night sweats  All other systems reviewed and are negative    Vital Signs Last 24 Hrs  T(C): 37.1 (16 Oct 2019 06:14), Max: 39.2 (15 Oct 2019 10:59)  T(F): 98.7 (16 Oct 2019 06:14), Max: 102.6 (15 Oct 2019 10:59)  HR: 82 (16 Oct 2019 07:30) (64 - 102)  BP: 108/57 (16 Oct 2019 06:14) (103/80 - 143/77)  BP(mean): --  RR: 18 (16 Oct 2019 06:14) (18 - 18)  SpO2: 96% (16 Oct 2019 06:14) (94% - 100%)  Daily     Daily     PE:    Constitutional: frail looking  HEENT: NC/AT, EOMI, PERRLA, conjunctivae clear; ears and nose atraumatic; pharynx benign  Neck: supple; thyroid not palpable  Back: no tenderness  Respiratory: respiratory effort normal; clear to auscultation  Cardiovascular: S1S2 regular, no murmurs  Abdomen: soft, not tender, not distended, positive BS; no liver or spleen organomegaly  colostomy  Genitourinary: no suprapubic tenderness  Lymphatic: no LN palpable  Musculoskeletal: no muscle tenderness, no joint swelling or tenderness  Extremities: no pedal edema  Neurological/ Psychiatric: AxOx3, judgement and insight normal; moving all extremities  Skin: no rashes; no palpable lesions    Labs: all available labs reviewed                        9.7    4.15  )-----------( 143      ( 16 Oct 2019 07:16 )             28.9     10-16    143  |  113<H>  |  7   ----------------------------<  105<H>  3.0<L>   |  20<L>  |  1.12    Ca    7.6<L>      16 Oct 2019 07:16  Phos  2.7     10-15  Mg     1.7     10-15    TPro  6.0  /  Alb  2.5<L>  /  TBili  0.5  /  DBili  x   /  AST  30  /  ALT  26  /  AlkPhos  44  10-15     LIVER FUNCTIONS - ( 15 Oct 2019 07:28 )  Alb: 2.5 g/dL / Pro: 6.0 gm/dL / ALK PHOS: 44 U/L / ALT: 26 U/L / AST: 30 U/L / GGT: x           Urinalysis Basic - ( 14 Oct 2019 22:45 )    Color: Yellow / Appearance: Clear / S.010 / pH: x  Gluc: x / Ketone: Negative  / Bili: Negative / Urobili: Negative mg/dL   Blood: x / Protein: 100 mg/dL / Nitrite: Negative   Leuk Esterase: Negative / RBC: 0-2 /HPF / WBC 3-5   Sq Epi: x / Non Sq Epi: Few / Bacteria: Few      Radiology: all available radiological tests reviewed    < from: MR Abdomen No Cont (10.15.19 @ 17:34) >  1. Poor distention of the stomach. Prominence of gastric folds which   could be   secondary to underdistention or gastritis.   2. Prior colectomy with left lower quadrant ileostomy.   3. Cholelithiasis. No obvious gallbladder edema. No evidence of biliary   obstruction.   4. Hepatomegaly with fatty infiltration of the liver.    < end of copied text >    < from: US Abdomen Complete (10.15.19 @ 13:08) >  Fatty infiltration of the liver.  Cholelithiasis, without acute cholecystitis or biliary ductal dilatation.    < end of copied text >      Advanced directives addressed: full resuscitation

## 2019-10-16 NOTE — CONSULT NOTE ADULT - ASSESSMENT
65 y/o Female with h/o COPD (not on O2/steroids), pre-diabetes, Crohn's disease, right breast cancer s/p mastectomy on tamoxifen was admitted on 10/14 for fever x 4 days. Febrile to Tmax of 104.1 on day of presentation. Motrin has been abating the fever. Pt states she went to an Urgent Care the day prior to presentation and was given a course of amoxacillin for which she has had 3 doses. She has headache, nonradiating, 6-8/10 intensity x one day. Has decreased appetite, feelings of "rumbling in my stomach", fatigue. In ER she received vancomycin IV and zosyn.    1. Febrile syndrome. 65 y/o Female with h/o COPD (not on O2/steroids), pre-diabetes, Crohn's disease, right breast cancer s/p mastectomy on tamoxifen was admitted on 10/14 for fever x 4 days. Febrile to Tmax of 104.1 on day of presentation. Motrin has been abating the fever. Pt states she went to an Urgent Care the day prior to presentation and was given a course of amoxacillin for which she has had 3 doses. She has headache, nonradiating, 6-8/10 intensity x one day. Has decreased appetite, feelings of "rumbling in my stomach", fatigue. In ER she received vancomycin IV and zosyn.    1. Febrile syndrome. Crohn's disease.  -obtain BC x 2, urine c/s  -obtain EBV, CMV, HIV, Lyme serology  -would observe off abx for now  -old chart reviewed to assess prior cultures  -monitor temps  -f/u CBC  -supportive care  2. Other issues:   -care per medicine 65 y/o Female with h/o COPD (not on O2/steroids), pre-diabetes, Crohn's disease s/p colostomy, right breast cancer s/p mastectomy on tamoxifen was admitted on 10/14 for fever x 4 days. Febrile to Tmax of 104.1 on day of presentation. Motrin has been abating the fever. Pt states she went to an Urgent Care the day prior to presentation and was given a course of amoxacillin for which she has had 3 doses. She has headache, nonradiating, 6-8/10 intensity x one day. Has decreased appetite, feelings of "rumbling in my stomach", fatigue. She was seen 10/9 in ED w/complaints of abdominal pain. +UA in the setting of many epithelial cells; Ucx negative. CT abd/pelvis w/PO contrast w/o acute pathology symptoms improved after IVF and morphine. Pt was  d/c with instructions to follow up outpt with GI. In ER she received vancomycin IV and zosyn.    1. Febrile syndrome. Acute enterocolitis. Crohn's disease.  -possible viral illness  -obtain BC x 2, urine c/s  -obtain EBV, CMV, HIV, Lyme serology, toxo  -obtain ESR, CRP, ferritin, LDH  -obtain stool for GI PCR  -would observe off abx for now  -old chart reviewed to assess prior cultures  -monitor temps  -f/u CBC  -supportive care  2. Other issues:   -care per medicine

## 2019-10-16 NOTE — PROVIDER CONTACT NOTE (OTHER) - ACTION/TREATMENT ORDERED:
Iv Tylenol x1 ordered recheck vitals in one hours  0339- Updated MD with vitals temp 102.2 STAT lacate, motrin x1, instructed to bolus remaining NS IV fluid free flowing (~500ml abel)

## 2019-10-16 NOTE — PROGRESS NOTE ADULT - SUBJECTIVE AND OBJECTIVE BOX
Pt has been seen and examined with FP resident, resident supervised agree with a/p       Patient is a 64y old  Female who presents with a chief complaint of Persistent Fevers (15 Oct 2019 02:38)        HPI:  64F with COPD (not on O2/steroids), Prediabetes (2017 a1c =5.6), Crohn's, hx of R breast cancer s/p mastectomy now on tamoxifen presented to  ED with concerns regarding 4 days of progressively worsening fever      PHYSICAL EXAM:    -[id number]  -cvs-s1s2 normal   -p/a-soft, right upper/epigastric tenderness, guarding present, bs+  -extremity- no asymmetrical swelling noted   -cns- non focal         A/P    #Sepsis -? etiology   probably viral in nature, so far work up is negative   -URTI     #supportive care, off abx observation

## 2019-10-16 NOTE — PROGRESS NOTE ADULT - ASSESSMENT
64F with a PMH significant for COPD and total colectomy S/P ileostomy who presented to [facility] with a 1 week history of persistent fevers and abdominal pain admitted for community acquired pneumonia.       #Fever of unknown origin complicated by SIRS in the setting of being immunocompromised. 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

## 2019-10-16 NOTE — PROGRESS NOTE ADULT - SUBJECTIVE AND OBJECTIVE BOX
64F with a PMH significant for COPD and total protocolectomy S/P ileostomy who presented to [facility] with a 1 week history of persistent fevers and abdominal pain. Upon arrival to the ED she was found to be febrile (T 102.4F) and tachycardic(103), meeting the criteria for sepsis. The protocol was initiated, Cultures were drawn, she was given a dose of Vancomycin and IVFs were initiated. RVP/CT Head was negative, CXR was non conclusive, however CT chest revealed mild bilateral lower lobe opacities. She was then admitted to the floor for (((HAP vs CAP))). In addition to the primary medical team she was followed by .... On the floor she became septic again, managed on the protocol.          Subjective: Pt was seen and examined at bedside this morning. Per the nurse, the pt had a febrile episode last night that resolved with a dose of Tylenol. No other SIRS criteria met, no protocol initiated The pt states that she couldn't sleep through the night, but reports no abdominal pain, fevers or chills, remaining ROS unremarkable.      MEDICATIONS  (STANDING):  ALBUTerol/ipratropium for Nebulization 3 milliLiter(s) Nebulizer every 6 hours  buDESOnide  80 MICROgram(s)/formoterol 4.5 MICROgram(s) Inhaler 2 Puff(s) Inhalation two times a day  cholecalciferol 1000 Unit(s) Oral daily  heparin  Injectable 5000 Unit(s) SubCutaneous every 8 hours  influenza   Vaccine 0.5 milliLiter(s) IntraMuscular once  pantoprazole  Injectable 40 milliGRAM(s) IV Push daily  piperacillin/tazobactam IVPB.. 3.375 Gram(s) IV Intermittent every 8 hours  potassium chloride    Tablet ER 40 milliEquivalent(s) Oral once  sodium chloride 0.9%. 1000 milliLiter(s) (125 mL/Hr) IV Continuous <Continuous>  tamoxifen 20 milliGRAM(s) Oral daily    MEDICATIONS  (PRN):  ondansetron Injectable 4 milliGRAM(s) IV Push every 6 hours PRN Nausea      Vital Signs Last 24 Hrs  T(C): 37.1 (16 Oct 2019 06:14), Max: 39.2 (15 Oct 2019 10:59)  T(F): 98.7 (16 Oct 2019 06:14), Max: 102.6 (15 Oct 2019 10:59)  HR: 82 (16 Oct 2019 07:30) (64 - 102)  BP: 108/57 (16 Oct 2019 06:14) (103/80 - 143/77)  RR: 18 (16 Oct 2019 06:14) (18 - 18)  SpO2: 96% (16 Oct 2019 06:14) (94% - 100%)    GEN: NAD, comfortable, resting in bed  HEENT: NC/AT, EOMI, PERRLA, MMM, clear conjunctiva and sclera, normal hearing, no nasal discharge, throat clear, no thrush, normal dentition.   NECK: supple, no JVD, no LAD, no thyromegaly  BACK:  ROM intact, no spinal/paraspinal tenderness  CV: S1S2, RRR, no mumur  RESP: good air movement, CTABL, no rales, rhonchi or wheezing, respirations unlabored  ABD: +BS, soft, ND, +tenderness to palpation of the epigastric region, no guarding, no rigidity, no HSM, +LLQ ileostomy bag no drainage, erythema surrounding bag  EXT: +2 radial and pedal pulses, no edema, no calve tenderness  SKIN: No visible Rashes or Ulcers  MSK: ROM intact in all extremities  NEURO:  sensory and CN 2-12 Grossly intact, no motor deficits, no, saddle anesthesia, AOx3  PSYCH: normal behavior                           9.7    4.15  )-----------( 143      ( 16 Oct 2019 07:16 )             28.9     16 Oct 2019 07:16    143    |  113    |  7      ----------------------------<  105    3.0     |  20     |  1.12     Ca    7.6        16 Oct 2019 07:16  Phos  2.7       15 Oct 2019 07:28  Mg     1.7       15 Oct 2019 07:28    TPro  6.0    /  Alb  2.5    /  TBili  0.5    /  DBili  x      /  AST  30     /  ALT  26     /  AlkPhos  44     15 Oct 2019 07:28    LIVER FUNCTIONS - ( 15 Oct 2019 07:28 )  Alb: 2.5 g/dL / Pro: 6.0 gm/dL / ALK PHOS: 44 U/L / ALT: 26 U/L / AST: 30 U/L / GGT: x           PT/INR - ( 14 Oct 2019 20:57 )   PT: 11.5 sec;   INR: 1.03 ratio         PTT - ( 14 Oct 2019 20:57 )  PTT:28.7 sec  CAPILLARY BLOOD GLUCOSE      POCT Blood Glucose.: 113 mg/dL (16 Oct 2019 08:20)  POCT Blood Glucose.: 124 mg/dL (15 Oct 2019 18:07)  POCT Blood Glucose.: 113 mg/dL (15 Oct 2019 11:59)        Urinalysis Basic - ( 14 Oct 2019 22:45 )    Color: Yellow / Appearance: Clear / S.010 / pH: x  Gluc: x / Ketone: Negative  / Bili: Negative / Urobili: Negative mg/dL   Blood: x / Protein: 100 mg/dL / Nitrite: Negative   Leuk Esterase: Negative / RBC: 0-2 /HPF / WBC 3-5   Sq Epi: x / Non Sq Epi: Few / Bacteria: Few      RADIOLOGY:  < from: Xray Chest 1 View-PORTABLE IMMEDIATE (10.14.19 @ 21:39) >  IMPRESSION:    Right axillary surgical clips.   The lungs are clear.  No pleural abnormality is seen, however the left   costophrenic angle is cut off from view.    Cardiomediastinal silhouette is intact.  < from: CT Head No Cont (10.15.19 @ 03:16) >  IMPRESSION:   1. No evidence of acute large vessel infarction.   2. No evidence of acute intracranial hemorrhage, extraaxial fluid or   midline shift.   3. Mild cerebral atrophy.   4. 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  < from: CT Chest No Cont (10.15.19 @ 03:17) >  IMPRESSION:   No evidence of pneumonia.  Emphysema and old granulomatous disease.  Small pulmonary nodules largest measuring 4 mm in the right lower lobe.   Follow-up noncontrast low-dose CT scan of the chest in 12 months is   recommended.  < from: US Abdomen Complete (10.15.19 @ 13:08) >  Fatty infiltration of the liver.  Cholelithiasis, without acute cholecystitis or biliary ductal dilatation.        < from: CT Head No Cont (10.15.19 @ 03:16) >  IMPRESSION:   1. No evidence of acute large vessel infarction.   2. No evidence of acute intracranial hemorrhage, extraaxial fluid or   midline   shift.   3. Mild cerebral atrophy.   4. 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    < end of copied text >

## 2019-10-17 ENCOUNTER — TRANSCRIPTION ENCOUNTER (OUTPATIENT)
Age: 64
End: 2019-10-17

## 2019-10-17 LAB
ANION GAP SERPL CALC-SCNC: 12 MMOL/L — SIGNIFICANT CHANGE UP (ref 5–17)
BUN SERPL-MCNC: 10 MG/DL — SIGNIFICANT CHANGE UP (ref 7–23)
CALCIUM SERPL-MCNC: 7.5 MG/DL — LOW (ref 8.5–10.1)
CHLORIDE SERPL-SCNC: 114 MMOL/L — HIGH (ref 96–108)
CO2 SERPL-SCNC: 17 MMOL/L — LOW (ref 22–31)
CREAT SERPL-MCNC: 0.85 MG/DL — SIGNIFICANT CHANGE UP (ref 0.5–1.3)
GLUCOSE SERPL-MCNC: 98 MG/DL — SIGNIFICANT CHANGE UP (ref 70–99)
HCT VFR BLD CALC: 29.2 % — LOW (ref 34.5–45)
HGB BLD-MCNC: 9.8 G/DL — LOW (ref 11.5–15.5)
MAGNESIUM SERPL-MCNC: 1.6 MG/DL — SIGNIFICANT CHANGE UP (ref 1.6–2.6)
MCHC RBC-ENTMCNC: 31.4 PG — SIGNIFICANT CHANGE UP (ref 27–34)
MCHC RBC-ENTMCNC: 33.6 GM/DL — SIGNIFICANT CHANGE UP (ref 32–36)
MCV RBC AUTO: 93.6 FL — SIGNIFICANT CHANGE UP (ref 80–100)
PHOSPHATE SERPL-MCNC: 1.1 MG/DL — LOW (ref 2.5–4.5)
PLATELET # BLD AUTO: 186 K/UL — SIGNIFICANT CHANGE UP (ref 150–400)
POTASSIUM SERPL-MCNC: 3 MMOL/L — LOW (ref 3.5–5.3)
POTASSIUM SERPL-SCNC: 3 MMOL/L — LOW (ref 3.5–5.3)
RBC # BLD: 3.12 M/UL — LOW (ref 3.8–5.2)
RBC # FLD: 13.8 % — SIGNIFICANT CHANGE UP (ref 10.3–14.5)
SODIUM SERPL-SCNC: 143 MMOL/L — SIGNIFICANT CHANGE UP (ref 135–145)
WBC # BLD: 9.16 K/UL — SIGNIFICANT CHANGE UP (ref 3.8–10.5)
WBC # FLD AUTO: 9.16 K/UL — SIGNIFICANT CHANGE UP (ref 3.8–10.5)

## 2019-10-17 RX ORDER — TRAMADOL HYDROCHLORIDE 50 MG/1
25 TABLET ORAL ONCE
Refills: 0 | Status: DISCONTINUED | OUTPATIENT
Start: 2019-10-17 | End: 2019-10-17

## 2019-10-17 RX ORDER — ALPRAZOLAM 0.25 MG
0.25 TABLET ORAL ONCE
Refills: 0 | Status: DISCONTINUED | OUTPATIENT
Start: 2019-10-17 | End: 2019-10-17

## 2019-10-17 RX ORDER — IBUPROFEN 200 MG
400 TABLET ORAL ONCE
Refills: 0 | Status: COMPLETED | OUTPATIENT
Start: 2019-10-17 | End: 2019-10-17

## 2019-10-17 RX ORDER — ALPRAZOLAM 0.25 MG
0.25 TABLET ORAL THREE TIMES A DAY
Refills: 0 | Status: DISCONTINUED | OUTPATIENT
Start: 2019-10-17 | End: 2019-10-18

## 2019-10-17 RX ORDER — POTASSIUM CHLORIDE 20 MEQ
20 PACKET (EA) ORAL
Refills: 0 | Status: COMPLETED | OUTPATIENT
Start: 2019-10-17 | End: 2019-10-17

## 2019-10-17 RX ORDER — PANTOPRAZOLE SODIUM 20 MG/1
1 TABLET, DELAYED RELEASE ORAL
Qty: 30 | Refills: 0
Start: 2019-10-17 | End: 2019-11-15

## 2019-10-17 RX ORDER — ACETAMINOPHEN 500 MG
2 TABLET ORAL
Qty: 0 | Refills: 0 | DISCHARGE
Start: 2019-10-17

## 2019-10-17 RX ADMIN — Medication 650 MILLIGRAM(S): at 00:54

## 2019-10-17 RX ADMIN — HEPARIN SODIUM 5000 UNIT(S): 5000 INJECTION INTRAVENOUS; SUBCUTANEOUS at 21:06

## 2019-10-17 RX ADMIN — Medication 400 MILLIGRAM(S): at 05:11

## 2019-10-17 RX ADMIN — HEPARIN SODIUM 5000 UNIT(S): 5000 INJECTION INTRAVENOUS; SUBCUTANEOUS at 06:13

## 2019-10-17 RX ADMIN — Medication 20 MILLIEQUIVALENT(S): at 19:54

## 2019-10-17 RX ADMIN — Medication 400 MILLIGRAM(S): at 04:11

## 2019-10-17 RX ADMIN — TAMOXIFEN CITRATE 20 MILLIGRAM(S): 20 TABLET, FILM COATED ORAL at 12:27

## 2019-10-17 RX ADMIN — Medication 3 MILLILITER(S): at 01:38

## 2019-10-17 RX ADMIN — TRAMADOL HYDROCHLORIDE 25 MILLIGRAM(S): 50 TABLET ORAL at 22:02

## 2019-10-17 RX ADMIN — Medication 650 MILLIGRAM(S): at 15:26

## 2019-10-17 RX ADMIN — Medication 650 MILLIGRAM(S): at 01:54

## 2019-10-17 RX ADMIN — Medication 20 MILLIEQUIVALENT(S): at 17:58

## 2019-10-17 RX ADMIN — Medication 650 MILLIGRAM(S): at 16:00

## 2019-10-17 RX ADMIN — Medication 650 MILLIGRAM(S): at 06:15

## 2019-10-17 RX ADMIN — Medication 0.25 MILLIGRAM(S): at 13:37

## 2019-10-17 RX ADMIN — PANTOPRAZOLE SODIUM 40 MILLIGRAM(S): 20 TABLET, DELAYED RELEASE ORAL at 12:27

## 2019-10-17 RX ADMIN — TRAMADOL HYDROCHLORIDE 25 MILLIGRAM(S): 50 TABLET ORAL at 21:02

## 2019-10-17 RX ADMIN — Medication 1000 UNIT(S): at 12:27

## 2019-10-17 NOTE — PROGRESS NOTE ADULT - SUBJECTIVE AND OBJECTIVE BOX
Date of service: 10-17-19 @ 08:35    Lying in bed in NAD  Feels better  Has 2 episodes with anxiety and SOB ?panic attack  Abdominal pain is improving  Colostomy is functional  No rashes or new symptoms  Has low grade fever    ROS: denies dizziness, no HA, no cough, no dysuria, no legs pain, no rashes    MEDICATIONS  (STANDING):  ALBUTerol/ipratropium for Nebulization 3 milliLiter(s) Nebulizer every 6 hours  buDESOnide  80 MICROgram(s)/formoterol 4.5 MICROgram(s) Inhaler 2 Puff(s) Inhalation two times a day  cholecalciferol 1000 Unit(s) Oral daily  heparin  Injectable 5000 Unit(s) SubCutaneous every 8 hours  influenza   Vaccine 0.5 milliLiter(s) IntraMuscular once  pantoprazole  Injectable 40 milliGRAM(s) IV Push daily  sodium chloride 0.9%. 1000 milliLiter(s) (125 mL/Hr) IV Continuous <Continuous>  sodium chloride 0.9%. 1000 milliLiter(s) (125 mL/Hr) IV Continuous <Continuous>  tamoxifen 20 milliGRAM(s) Oral daily      Vital Signs Last 24 Hrs  T(C): 37.1 (17 Oct 2019 05:19), Max: 37.4 (16 Oct 2019 15:40)  T(F): 98.7 (17 Oct 2019 05:19), Max: 99.4 (16 Oct 2019 15:40)  HR: 91 (17 Oct 2019 05:19) (72 - 97)  BP: 129/66 (17 Oct 2019 05:19) (106/53 - 157/80)  BP(mean): --  RR: 18 (17 Oct 2019 05:19) (18 - 18)  SpO2: 95% (17 Oct 2019 05:19) (93% - 98%)    Physical Exam:      Constitutional: frail looking  HEENT: NC/AT, EOMI, PERRLA, conjunctivae clear  Neck: supple; thyroid not palpable  Back: no tenderness  Respiratory: respiratory effort normal; clear to auscultation  Cardiovascular: S1S2 regular, no murmurs  Abdomen: soft, not tender, not distended, positive BS  colostomy  Genitourinary: no suprapubic tenderness  Lymphatic: no LN palpable  Musculoskeletal: no muscle tenderness, no joint swelling or tenderness  Extremities: no pedal edema  Neurological/ Psychiatric: AxOx3, moving all extremities  Skin: no rashes; no palpable lesions    Labs: all available labs reviewed                        9.7    4.15  )-----------( 143      ( 16 Oct 2019 07:16 )             28.9     10-16    143  |  113<H>  |  7   ----------------------------<  105<H>  3.0<L>   |  20<L>  |  1.12    Ca    7.6<L>      16 Oct 2019 07:16  Phos  2.7     10-15  Mg     1.7     10-15    TPro  6.0  /  Alb  2.5<L>  /  TBili  0.5  /  DBili  x   /  AST  30  /  ALT  26  /  AlkPhos  44  10-15     LIVER FUNCTIONS - ( 15 Oct 2019 07:28 )  Alb: 2.5 g/dL / Pro: 6.0 gm/dL / ALK PHOS: 44 U/L / ALT: 26 U/L / AST: 30 U/L / GGT: x           Urinalysis Basic - ( 14 Oct 2019 22:45 )    Color: Yellow / Appearance: Clear / S.010 / pH: x  Gluc: x / Ketone: Negative  / Bili: Negative / Urobili: Negative mg/dL   Blood: x / Protein: 100 mg/dL / Nitrite: Negative   Leuk Esterase: Negative / RBC: 0-2 /HPF / WBC 3-5   Sq Epi: x / Non Sq Epi: Few / Bacteria: Few      Babesia microti PCR, Blood. (collected 16 Oct 2019 10:22)  Source: .Blood None  Final Report (16 Oct 2019 20:37):    Babesia microti PCR    Results: NOT detected    ***************Result Note*************    The detection of Babesia microti by PCR has only been    validated for whole blood; this test has not been approved    by the [place] Food and Drug Administration (FDA). Performance    characteristics of this assay have been determined by    [facility]. The clinical significance    of results should be considered in conjunction with the    overall clinical presentation of the patient. Result is not    intended to be used as the sole means for clinical diagnosis    or patient management decisions.    One negative sample does not necessarily rule    out the presence of a parasitic infection.    GI PCR Panel, Stool (collected 16 Oct 2019 09:47)  Source: .Stool Feces  Final Report (16 Oct 2019 21:19):    GI PCR Results: NOT detected    *******Please Note:*******    GI panel PCR evaluates for:    Campylobacter, Plesiomonas shigelloides, Salmonella,    Vibrio, Yersinia enterocolitica, Enteroaggregative    Escherichia coli (EAEC), Enteropathogenic E.coli (EPEC),    Enterotoxigenic E. coli (ETEC) lt/st, Shiga-like    toxin-producing E. coli (STEC) stx1/stx2,    Shigella/ Enteroinvasive E. coli (EIEC), Cryptosporidium,    Cyclospora cayetanensis, Entamoeba histolytica,    Giardia lamblia, Adenovirus F 40/41, Astrovirus,    Norovirus GI/GII, Rotavirus A, Sapovirus    Culture - Urine (collected 14 Oct 2019 22:45)  Source: .Urine Clean Catch (Midstream)  Final Report (16 Oct 2019 08:18):    No growth    Culture - Blood (collected 14 Oct 2019 20:57)  Source: .Blood Blood-Peripheral  Preliminary Report (16 Oct 2019 03:01):    No growth to date.    Culture - Blood (collected 14 Oct 2019 20:57)  Source: .Blood Blood-Peripheral  Preliminary Report (16 Oct 2019 03:01):    No growth to date.    Culture - Urine (collected 09 Oct 2019 08:46)  Source: .Urine None  Final Report (10 Oct 2019 12:30):    Normal Urogenital silvestre present        Radiology: all available radiological tests reviewed    < from: MR Abdomen No Cont (10.15.19 @ 17:34) >  1. Poor distention of the stomach. Prominence of gastric folds which   could be   secondary to underdistention or gastritis.   2. Prior colectomy with left lower quadrant ileostomy.   3. Cholelithiasis. No obvious gallbladder edema. No evidence of biliary   obstruction.   4. Hepatomegaly with fatty infiltration of the liver.    < end of copied text >    < from: US Abdomen Complete (10.15.19 @ 13:08) >  Fatty infiltration of the liver.  Cholelithiasis, without acute cholecystitis or biliary ductal dilatation.    < end of copied text >      Advanced directives addressed: full resuscitation

## 2019-10-17 NOTE — DISCHARGE NOTE PROVIDER - NSDCCPCAREPLAN_GEN_ALL_CORE_FT
PRINCIPAL DISCHARGE DIAGNOSIS  Diagnosis: Fever in adult  Assessment and Plan of Treatment: Your fevers were most likely a reaction of your body's immune system against a viral infection. These types of infections are usually self limiting, and are to be treated with rest and hydration. Originaly we treated you with antibiotics because you met the criteria for Sepsis. Your sepsis has since resolved and all cultures pointing towards a bacterial infection returned negative (Urine Culture, Blood Culture, Lyme). It is imperitve that you stay hydrated and PRINCIPAL DISCHARGE DIAGNOSIS  Diagnosis: Fever in adult  Assessment and Plan of Treatment: Your fevers were most likely a reaction of your body's immune system against a viral infection. These types of infections are usually self limiting, and are to be treated with rest and hydration. Originaly we treated you with antibiotics because you met the criteria for Sepsis. Your sepsis has since resolved and all cultures pointing towards a bacterial infection returned negative (Urine Culture, Blood Culture, Test for Lyme Disease). It is imperitve that you stay hydrated and follow up with your PCP upon discharge from the hospital. PRINCIPAL DISCHARGE DIAGNOSIS  Diagnosis: Viral enterocolitis  Assessment and Plan of Treatment: Patient with viral enterocolitis which was likely causing the fevers  Continue with hydration and eating food that you can tolerate  Should you experience fevers, chills, nausea and vomiting then return to the ED  Follow up with your primary physician within 1 week      SECONDARY DISCHARGE DIAGNOSES  Diagnosis: Chronic obstructive pulmonary disease (COPD)  Assessment and Plan of Treatment: The COPD is stable   Continue with home medications and follow up with your primary doctor for further management    Diagnosis: Fever in adult  Assessment and Plan of Treatment: Your fevers were most likely a reaction of your body's immune system against a viral infection. Continue with hydration and  follow up with your physician upon discharge from the hospital. PRINCIPAL DISCHARGE DIAGNOSIS  Diagnosis: Viral enterocolitis  Assessment and Plan of Treatment: Patient with viral enterocolitis which was likely causing the fevers  Continue with hydration and eating food that you can tolerate  Should you experience fevers, chills, nausea and vomiting then return to the ED  Continue with the protonix 20mg once a day for 10 days and follow up with your primary care doctor about continuing the protonix        SECONDARY DISCHARGE DIAGNOSES  Diagnosis: Chronic obstructive pulmonary disease (COPD)  Assessment and Plan of Treatment: The COPD is stable   Continue with home medications and follow up with your primary doctor for further management    Diagnosis: Fever in adult  Assessment and Plan of Treatment: Your fevers were most likely a reaction of your body's immune system against a viral infection. Continue with hydration and  follow up with your physician upon discharge from the hospital. PRINCIPAL DISCHARGE DIAGNOSIS  Diagnosis: Viral enterocolitis  Assessment and Plan of Treatment: Patient with viral enterocolitis which was likely causing the fevers  Continue with hydration and eating food that you can tolerate  Should you experience fevers, chills, nausea and vomiting then return to the ED  Continue with the protonix 20mg once a day for 10 days and follow up with your primary care doctor about continuing the protonix        SECONDARY DISCHARGE DIAGNOSES  Diagnosis: Headache  Assessment and Plan of Treatment: A tension headache is generally a diffuse, mild to moderate pain in your head that's often described as feeling like a tight band around your head. A tension headache (tension-type headache) is the most common type of headache, and yet its causes aren't well-understood. Treatments for tension headaches are available. Managing a tension headache is often a balance between fostering healthy habits, finding effective nondrug treatments and using medications appropriately. Should your Headaches persist, feel free to use over the counter medications such as Advil as the neurologist recommended, should your symptoms persit please shcedule a follow up appoitment with the Neurologist Dr. Beverly.    Diagnosis: Fever in adult  Assessment and Plan of Treatment: Your fevers were most likely a reaction of your body's immune system against a viral infection. Continue with hydration and  follow up with your physician upon discharge from the hospital.    Diagnosis: Chronic obstructive pulmonary disease (COPD)  Assessment and Plan of Treatment: The COPD is stable   Continue with home medications and follow up with your primary doctor for further management

## 2019-10-17 NOTE — DISCHARGE NOTE PROVIDER - PROVIDER TOKENS
PROVIDER:[TOKEN:[[id number]],FOLLOWUP:[1 week]] PROVIDER:[TOKEN:[[id number]],FOLLOWUP:[1 week]],PROVIDER:[TOKEN:[[id number]]]

## 2019-10-17 NOTE — DISCHARGE NOTE PROVIDER - HOSPITAL COURSE
64F with a PMH significant for COPD and total protocolectomy S/P ileostomy who presented to [facility] with a 1 week history of persistent fevers and abdominal pain. Upon arrival to the ED she was found to be febrile (T 102.4F) and tachycardic(103), meeting the criteria for sepsis. The protocol was initiated, Cultures were drawn, she was given a dose of Vancomycin and IVFs were initiated. RVP/CT Head was negative, CXR was non conclusive, however CT chest revealed a small 4mm Nodule in her RLL with no evidence of PNA. She was then admitted to the floor for fevers of unknown origin. In addition to the primary medical team she was followed by Infectious disease who continued her on IV Zosyn. On the floor she became septic again, which resolved after completion of the Sepsis protocol. CT abd/pelvis displayed Fatty liver infiltrates and cholelithiasis w/o cholecystitis which was also reflected in the Abd U/S. Labs for CMV/Toxo/Lyme were all negative, with a slightly elevated ESR (48) ,she was thought to have an Acute Enterocolitis likely secondary to a viral infection, and so her IV antibiotics were discontinued. On the day of discharge she was found to be medically optimized and clinically stable for discharge back home to followup with her PCP, Dr. Darin Austin, within a week.             Discharge Vital Signs Last 24 Hrs    T(C): 36.6 (17 Oct 2019 11:20), Max: 37.4 (16 Oct 2019 15:40)    T(F): 97.9 (17 Oct 2019 11:20), Max: 99.4 (16 Oct 2019 15:40)    HR: 86 (17 Oct 2019 11:20) (73 - 97)    BP: 122/78 (17 Oct 2019 11:20) (106/53 - 157/80)    RR: 18 (17 Oct 2019 11:20) (18 - 18)    SpO2: 94% (17 Oct 2019 11:20) (93% - 96%)        Discharge PE:    GEN: NAD, comfortable, resting in bed    HEENT: NC/AT, EOMI, PERRLA, MMM, clear conjunctiva and sclera, normal hearing, no nasal discharge, throat clear, no thrush, normal dentition.     NECK: supple, no JVD, no LAD, no thyromegaly    BACK:  ROM intact, no spinal/paraspinal tenderness    CV: S1S2, RRR, no mumur    RESP: good air movement, CTABL, no rales, rhonchi or wheezing, respirations unlabored    ABD: +BS, soft, ND, +tenderness to palpation of the epigastric region, no guarding, no rigidity, no HSM, +LLQ ileostomy bag no drainage, erythema surrounding bag    EXT: +2 radial and pedal pulses, no edema, no calve tenderness    SKIN: No visible Rashes or Ulcers    MSK: ROM intact in all extremities    NEURO:  sensory and CN 2-12 Grossly intact, no motor deficits, no, saddle anesthesia, AOx3    PSYCH: normal behavior         Discharge LABS:                      9.8      9.16  )-----------( 186      ( 17 Oct 2019 07:47 )               29.2     17 Oct 2019 07:47        143    |  114    |  10       ----------------------------<  98       3.0     |  17     |  0.85     Ca    7.5        17 Oct 2019 07:47    CAPILLARY BLOOD GLUCOSE    POCT Blood Glucose.: 121 mg/dL (17 Oct 2019 11:53)    POCT Blood Glucose.: 101 mg/dL (17 Oct 2019 07:46)    POCT Blood Glucose.: 114 mg/dL (16 Oct 2019 16:39)        RADIOLOGY:    < from: Xray Chest 1 View-PORTABLE IMMEDIATE (10.14.19 @ 21:39) >    IMPRESSION:      Right axillary surgical clips.     The lungs are clear.  No pleural abnormality is seen, however the left     costophrenic angle is cut off from view.      Cardiomediastinal silhouette is intact.    < from: CT Head No Cont (10.15.19 @ 03:16) >    IMPRESSION:     1. No evidence of acute large vessel infarction.     2. No evidence of acute intracranial hemorrhage, extraaxial fluid or     midline shift.     3. Mild cerebral atrophy.     4. 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    < from: CT Chest No Cont (10.15.19 @ 03:17) >    IMPRESSION:     No evidence of pneumonia.    Emphysema and old granulomatous disease.    Small pulmonary nodules largest measuring 4 mm in the right lower lobe.     Follow-up noncontrast low-dose CT scan of the chest in 12 months is     recommended.    < from: US Abdomen Complete (10.15.19 @ 13:08) >    Fatty infiltration of the liver.    Cholelithiasis, without acute cholecystitis or biliary ductal dilatation.    < from: CT Head No Cont (10.15.19 @ 03:16) >    IMPRESSION:     1. No evidence of acute large vessel infarction.     2. No evidence of acute intracranial hemorrhage, extraaxial fluid or     midline     shift.     3. Mild cerebral atrophy.     4. 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 64F with a PMH significant for COPD and total protocolectomy S/P ileostomy who presented to [facility] with a 1 week history of persistent fevers and abdominal pain. Upon arrival to the ED she was found to be febrile and tachycardic, meeting the criteria for sepsis. The protocol was initiated, Cultures were drawn, she was given a dose of Vancomycin and IVFs were initiated. RVP test and CT Head were negative, CXR was non conclusive, however CT chest revealed a small 4mm Nodule in her RLL with no evidence of PNA. She was then admitted to the floor for fevers of unknown origin. In addition to the primary medical team she was followed by Infectious Disease who continued her on IV Zosyn. On the floor she became septic again, which resolved after completion of the Sepsis protocol. CT abd/pelvis displayed Fatty liver infiltrates and cholelithiasis w/o cholecystitis which was also reflected in the Abd U/S. Labs for CMV/Toxo/Lyme were all negative, with a slightly elevated ESR (48) ,she was thought to have an Acute Enterocolitis likely secondary to a viral infection, and so her IV antibiotics were discontinued. On the day of discharge she was found to be medically optimized and clinically stable for discharge back home to followup with her PCP, Dr. Darin Austin, within a week.             Discharge Vital Signs Last 24 Hrs    T(C): 36.6 (17 Oct 2019 11:20), Max: 37.4 (16 Oct 2019 15:40)    T(F): 97.9 (17 Oct 2019 11:20), Max: 99.4 (16 Oct 2019 15:40)    HR: 86 (17 Oct 2019 11:20) (73 - 97)    BP: 122/78 (17 Oct 2019 11:20) (106/53 - 157/80)    RR: 18 (17 Oct 2019 11:20) (18 - 18)    SpO2: 94% (17 Oct 2019 11:20) (93% - 96%)        Discharge PE:    GEN: NAD, comfortable, resting in bed    HEENT: NC/AT, EOMI, PERRLA, MMM, clear conjunctiva and sclera, normal hearing, no nasal discharge, throat clear, no thrush, normal dentition.     NECK: supple, no JVD, no LAD, no thyromegaly    BACK:  ROM intact, no spinal/paraspinal tenderness    CV: S1S2, RRR, no mumur    RESP: good air movement, CTABL, no rales, rhonchi or wheezing, respirations unlabored    ABD: +BS, soft, ND, +tenderness to palpation of the epigastric region, no guarding, no rigidity, no HSM, +LLQ ileostomy bag no drainage, erythema surrounding bag    EXT: +2 radial and pedal pulses, no edema, no calve tenderness    SKIN: No visible Rashes or Ulcers    MSK: ROM intact in all extremities    NEURO:  sensory and CN 2-12 Grossly intact, no motor deficits, no, saddle anesthesia, AOx3    PSYCH: normal behavior         Discharge LABS:                      9.8      9.16  )-----------( 186      ( 17 Oct 2019 07:47 )               29.2     17 Oct 2019 07:47        143    |  114    |  10       ----------------------------<  98       3.0     |  17     |  0.85     Ca    7.5        17 Oct 2019 07:47    CAPILLARY BLOOD GLUCOSE    POCT Blood Glucose.: 121 mg/dL (17 Oct 2019 11:53)    POCT Blood Glucose.: 101 mg/dL (17 Oct 2019 07:46)    POCT Blood Glucose.: 114 mg/dL (16 Oct 2019 16:39)        RADIOLOGY:    < from: Xray Chest 1 View-PORTABLE IMMEDIATE (10.14.19 @ 21:39) >    IMPRESSION:      Right axillary surgical clips.     The lungs are clear.  No pleural abnormality is seen, however the left     costophrenic angle is cut off from view.      Cardiomediastinal silhouette is intact.    < from: CT Head No Cont (10.15.19 @ 03:16) >    IMPRESSION:     1. No evidence of acute large vessel infarction.     2. No evidence of acute intracranial hemorrhage, extraaxial fluid or     midline shift.     3. Mild cerebral atrophy.     4. 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    < from: CT Chest No Cont (10.15.19 @ 03:17) >    IMPRESSION:     No evidence of pneumonia.    Emphysema and old granulomatous disease.    Small pulmonary nodules largest measuring 4 mm in the right lower lobe.     Follow-up noncontrast low-dose CT scan of the chest in 12 months is     recommended.    < from: US Abdomen Complete (10.15.19 @ 13:08) >    Fatty infiltration of the liver.    Cholelithiasis, without acute cholecystitis or biliary ductal dilatation.    < from: CT Head No Cont (10.15.19 @ 03:16) >    IMPRESSION:     1. No evidence of acute large vessel infarction.     2. No evidence of acute intracranial hemorrhage, extraaxial fluid or     midline     shift.     3. Mild cerebral atrophy.     4. 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 64F with a PMH significant for COPD and total protocolectomy S/P ileostomy who presented to [facility] with a 1 week history of persistent fevers and abdominal pain. Upon arrival to the ED she was found to be febrile and tachycardic, meeting the criteria for sepsis. The protocol was initiated, Cultures were drawn, she was given a dose of Vancomycin and IVFs were initiated. RVP test and CT Head were negative, CXR was non conclusive, however CT chest revealed a small 4mm Nodule in her RLL which can be followed up outpatient, with no evidence of PNA. In addition to the primary medical team she was followed by Infectious Disease who continued her on IV Zosyn.  Imaging done displayed Fatty liver infiltrates and cholelithiasis w/o cholecystitis. She was thought to have an Acute Enterocolitis likely secondary to a viral infection, and so her IV antibiotics were discontinued. On the day of discharge she was found to be medically optimized and clinically stable for discharge back home to followup with her PCP, Dr. Darin Austin, within a week.             Discharge Vital Signs Last 24 Hrs    T(C): 36.6 (17 Oct 2019 11:20), Max: 37.4 (16 Oct 2019 15:40)    T(F): 97.9 (17 Oct 2019 11:20), Max: 99.4 (16 Oct 2019 15:40)    HR: 86 (17 Oct 2019 11:20) (73 - 97)    BP: 122/78 (17 Oct 2019 11:20) (106/53 - 157/80)    RR: 18 (17 Oct 2019 11:20) (18 - 18)    SpO2: 94% (17 Oct 2019 11:20) (93% - 96%)        Discharge PE:    GEN: NAD, comfortable, resting in bed    HEENT: NC/AT, EOMI, PERRLA, MMM, clear conjunctiva and sclera, normal hearing, no nasal discharge, throat clear, no thrush, normal dentition.     NECK: supple, no JVD, no LAD, no thyromegaly    BACK:  ROM intact, no spinal/paraspinal tenderness    CV: S1S2, RRR, no mumur    RESP: good air movement, CTABL, no rales, rhonchi or wheezing, respirations unlabored    ABD: +BS, soft, ND, +tenderness to palpation of the epigastric region, no guarding, no rigidity, no HSM, +LLQ ileostomy bag no drainage, erythema surrounding bag    EXT: +2 radial and pedal pulses, no edema, no calve tenderness    SKIN: No visible Rashes or Ulcers    MSK: ROM intact in all extremities    NEURO:  sensory and CN 2-12 Grossly intact, no motor deficits, no, saddle anesthesia, AOx3    PSYCH: normal behavior         Discharge LABS:                      9.8      9.16  )-----------( 186      ( 17 Oct 2019 07:47 )               29.2     17 Oct 2019 07:47        143    |  114    |  10       ----------------------------<  98       3.0     |  17     |  0.85     Ca    7.5        17 Oct 2019 07:47    CAPILLARY BLOOD GLUCOSE    POCT Blood Glucose.: 121 mg/dL (17 Oct 2019 11:53)    POCT Blood Glucose.: 101 mg/dL (17 Oct 2019 07:46)    POCT Blood Glucose.: 114 mg/dL (16 Oct 2019 16:39)        RADIOLOGY:    < from: Xray Chest 1 View-PORTABLE IMMEDIATE (10.14.19 @ 21:39) >    IMPRESSION:      Right axillary surgical clips.     The lungs are clear.  No pleural abnormality is seen, however the left     costophrenic angle is cut off from view.      Cardiomediastinal silhouette is intact.    < from: CT Head No Cont (10.15.19 @ 03:16) >    IMPRESSION:     1. No evidence of acute large vessel infarction.     2. No evidence of acute intracranial hemorrhage, extraaxial fluid or     midline shift.     3. Mild cerebral atrophy.     4. 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    < from: CT Chest No Cont (10.15.19 @ 03:17) >    IMPRESSION:     No evidence of pneumonia.    Emphysema and old granulomatous disease.    Small pulmonary nodules largest measuring 4 mm in the right lower lobe.     Follow-up noncontrast low-dose CT scan of the chest in 12 months is     recommended.    < from: US Abdomen Complete (10.15.19 @ 13:08) >    Fatty infiltration of the liver.    Cholelithiasis, without acute cholecystitis or biliary ductal dilatation.    < from: CT Head No Cont (10.15.19 @ 03:16) >    IMPRESSION:     1. No evidence of acute large vessel infarction.     2. No evidence of acute intracranial hemorrhage, extraaxial fluid or     midline     shift.     3. Mild cerebral atrophy.     4. 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

## 2019-10-17 NOTE — PROGRESS NOTE ADULT - ASSESSMENT
64F with a PMH significant for COPD and total colectomy S/P ileostomy who presented to [facility] with a 1 week history of persistent fevers and abdominal pain admitted for community acquired pneumonia.       #Fever of unknown origin complicated by SIRS in the setting of being immunocompromised. Could be 2/2 viral gastritis vs viral URI/LRI   - Sepsis, Resolved   - Cont IV Zosyn in light of possible abdominal infection   - blood and urine cultures: NGT  - RVP negative  - CT abd/pelvis: Fatty liver infiltrates, cholelithiasis w/o cholecystitis    - UDS (-)  - NO RECTAL TEMPERATURES in the setting of Status post total proctocolectomy  - Chest CT: Emphysema old granulomatous d/s; no pNA  - Maintenance IVF at 125cc/hr   - Strict I/Os  - F/u ID consult    #Hx of  recurrent Breast cancer s/p R mastectomy  - Continue Tamoxifen   - CT head: negative for acute pathology or mets     #DVT ppx  - Heparin sub Q in the setting of tamoxifen use and limited mobility due to malaise    #Advanced Care Planning <20mins  - Code Status: FULL CODE, MOLST signed 64F with a PMH significant for COPD and total colectomy S/P ileostomy who presented to [facility] with a 1 week history of persistent fevers and abdominal pain admitted for community acquired pneumonia.       #Fever of unknown origin likely 2/2 Viral Enterocolitis   - Sepsis, Resolved   - ID: d/c IV Zosyn; possible febrile syndrome vs Viral Enterocolitis   - Blood and urine cultures: NGT  - RVP/CMV/Toxo/Lyme: negative  - CT abd/pelvis: Fatty liver infiltrates, cholelithiasis w/o cholecystitis    - NO RECTAL TEMPERATURES in the setting of Status post total proctocolectomy  - Chest CT: Emphysema old granulomatous d/s; no pNA  - Maintenance IVF at 125cc/hr   - Strict I/Os      #Hx of  recurrent Breast cancer s/p R mastectomy  - Continue Tamoxifen   - CT head: negative for acute pathology or mets     #DVT ppx  - Heparin sub Q in the setting of tamoxifen use and limited mobility due to malaise    #Advanced Care Planning   - Code Status: FULL CODE, MOLST signed

## 2019-10-17 NOTE — DISCHARGE NOTE PROVIDER - CARE PROVIDER_API CALL
Darin Austin)  [place] [address]  Phone: [phone]  Fax: [phone]  Follow Up Time: 1 week [place])  [place] [address]  Phone: [phone]  Fax: [phone]  Follow Up Time: 1 week    Yun Daniels)  Clinical Neurophysiology; EEGEpilepsy; Neurology; Sleep Medicine  [address], [address]  [place] [address]  Phone: [phone]  Fax: [phone]  Follow Up Time:

## 2019-10-17 NOTE — PROGRESS NOTE ADULT - SUBJECTIVE AND OBJECTIVE BOX
Pt has been seen and examined with FP resident, resident supervised agree with a/p       Patient is a 64y old  Female who presents with a chief complaint of Persistent Fevers (15 Oct 2019 02:38)        HPI:  64F with COPD (not on O2/steroids), Prediabetes (2017 a1c =5.6), Crohn's, hx of R breast cancer s/p mastectomy now on tamoxifen presented to  ED with concerns regarding 4 days of progressively worsening fever      PHYSICAL EXAM:    general- comfortable   -[id number]-[id number],cta  -cvs-s1s2 normal   -p/a-soft, bs+  -extremity- no asymmetrical swelling noted   -cns- non focal         A/P    #Sepsis -? etiology probably from viral infection   -resolved     #d/c today if no fever towards end of day with further management as an outpt     #discussed with pt to follow up with pmd or come to er if continues to spike fever at home     #time spent 35 minutes

## 2019-10-17 NOTE — DISCHARGE NOTE PROVIDER - NSDCCAREPROVSEEN_GEN_ALL_CORE_FT
Alfredito, Madie Byrd, Susi Manzanares, Kathy Cerrato, Dorinda Dougherty, Ananth Fatima, Shakir ALCAZAR

## 2019-10-17 NOTE — PROGRESS NOTE ADULT - ASSESSMENT
65 y/o Female with h/o COPD (not on O2/steroids), pre-diabetes, Crohn's disease s/p colostomy, right breast cancer s/p mastectomy on tamoxifen was admitted on 10/14 for fever x 4 days. Febrile to Tmax of 104.1 on day of presentation. Motrin has been abating the fever. Pt states she went to an Urgent Care the day prior to presentation and was given a course of amoxacillin for which she has had 3 doses. She has headache, nonradiating, 6-8/10 intensity x one day. Has decreased appetite, feelings of "rumbling in my stomach", fatigue. She was seen 10/9 in ED w/complaints of abdominal pain. +UA in the setting of many epithelial cells; Ucx negative. CT abd/pelvis w/PO contrast w/o acute pathology symptoms improved after IVF and morphine. Pt was  d/c with instructions to follow up outpt with GI. In ER she received vancomycin IV and zosyn.    1. Febrile syndrome improving. Acute enterocolitis. Crohn's disease.  -probable viral illness  -inflammatory syndrome  - BC x 2, urine c/s reviewed  -CMV, Lyme serology, toxo are negative  -ESR, CRP, ferritin, LDH noted  -stool for GI PCR is negative  -continue to observe off abx   -f/u cultures  -monitor temps  -f/u CBC  -supportive care  2. Other issues:   -care per medicine

## 2019-10-17 NOTE — PROGRESS NOTE ADULT - SUBJECTIVE AND OBJECTIVE BOX
64F with a PMH significant for COPD and total protocolectomy S/P ileostomy who presented to [facility] with a 1 week history of persistent fevers and abdominal pain. Upon arrival to the ED she was found to be febrile (T 102.4F) and tachycardic(103), meeting the criteria for sepsis. The protocol was initiated, Cultures were drawn, she was given a dose of Vancomycin and IVFs were initiated. RVP/CT Head was negative, CXR was non conclusive, however CT chest revealed mild bilateral lower lobe opacities. She was then admitted to the floor for fevers of unknown origin. In addition to the primary medical team she was followed by .... On the floor she became septic again, managed on the protocol.          Subjective: Pt was seen and examined at bedside this morning. Per the nurse, pt became anxious after receiving her nebulizer treatment. Her anxiety persisted requiring a one time .25 dose of Xanex. This morning the pt also reports anxiety and tremulousness. Although she has remained afebrile for over 36hours and her symptoms have almost resolved she states she is to anxious to be discharged home. She also reports a frontal HA that recently began only after admission to the hospital. Will monitor her for an additional night prior to discharge home.     MEDICATIONS  (STANDING):  buDESOnide  80 MICROgram(s)/formoterol 4.5 MICROgram(s) Inhaler 2 Puff(s) Inhalation two times a day  cholecalciferol 1000 Unit(s) Oral daily  heparin  Injectable 5000 Unit(s) SubCutaneous every 8 hours  influenza   Vaccine 0.5 milliLiter(s) IntraMuscular once  pantoprazole  Injectable 40 milliGRAM(s) IV Push daily  tamoxifen 20 milliGRAM(s) Oral daily    MEDICATIONS  (PRN):  acetaminophen   Tablet .. 650 milliGRAM(s) Oral every 6 hours PRN Temp greater or equal to 38C (100.4F), Mild Pain (1 - 3)  ALPRAZolam 0.25 milliGRAM(s) Oral three times a day PRN Anxiety  ondansetron Injectable 4 milliGRAM(s) IV Push every 6 hours PRN Nausea        Vital Signs Last 24 Hrs  T(C): 36.6 (17 Oct 2019 11:20), Max: 37.1 (17 Oct 2019 04:31)  T(F): 97.9 (17 Oct 2019 11:20), Max: 98.7 (17 Oct 2019 04:31)  HR: 86 (17 Oct 2019 11:20) (73 - 97)  BP: 122/78 (17 Oct 2019 11:20) (106/53 - 157/80)  RR: 18 (17 Oct 2019 11:20) (18 - 18)  SpO2: 94% (17 Oct 2019 11:20) (93% - 96%)    GEN: NAD, comfortable, resting in bed  HEENT: NC/AT, EOMI, PERRLA, MMM, clear conjunctiva and sclera, normal hearing, no nasal discharge, throat clear, no thrush, normal dentition.   NECK: supple, no JVD, no LAD, no thyromegaly  BACK:  ROM intact, no spinal/paraspinal tenderness  CV: S1S2, RRR, no mumur  RESP: good air movement, CTABL, no rales, rhonchi or wheezing, respirations unlabored  ABD: +BS, soft, ND, +tenderness to palpation of the epigastric region, no guarding, no rigidity, no HSM, +LLQ ileostomy bag no drainage, erythema surrounding bag  EXT: +2 radial and pedal pulses, no edema, no calve tenderness  SKIN: No visible Rashes or Ulcers  MSK: ROM intact in all extremities  NEURO:  sensory and CN 2-12 Grossly intact, no motor deficits, no, saddle anesthesia, AOx3  PSYCH: normal behavior       Labs:                       9.8    9.16  )-----------( 186      ( 17 Oct 2019 07:47 )             29.2     17 Oct 2019 07:47    143    |  114    |  10     ----------------------------<  98     3.0     |  17     |  0.85     Ca    7.5        17 Oct 2019 07:47    CAPILLARY BLOOD GLUCOSE  POCT Blood Glucose.: 121 mg/dL (17 Oct 2019 11:53)  POCT Blood Glucose.: 101 mg/dL (17 Oct 2019 07:46)                   RADIOLOGY:  < from: Xray Chest 1 View-PORTABLE IMMEDIATE (10.14.19 @ 21:39) >  IMPRESSION:    Right axillary surgical clips.   The lungs are clear.  No pleural abnormality is seen, however the left   costophrenic angle is cut off from view.    Cardiomediastinal silhouette is intact.  < from: CT Head No Cont (10.15.19 @ 03:16) >  IMPRESSION:   1. No evidence of acute large vessel infarction.   2. No evidence of acute intracranial hemorrhage, extraaxial fluid or   midline shift.   3. Mild cerebral atrophy.   4. 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  < from: CT Chest No Cont (10.15.19 @ 03:17) >  IMPRESSION:   No evidence of pneumonia.  Emphysema and old granulomatous disease.  Small pulmonary nodules largest measuring 4 mm in the right lower lobe.   Follow-up noncontrast low-dose CT scan of the chest in 12 months is   recommended.  < from: US Abdomen Complete (10.15.19 @ 13:08) >  Fatty infiltration of the liver.  Cholelithiasis, without acute cholecystitis or biliary ductal dilatation.        < from: CT Head No Cont (10.15.19 @ 03:16) >  IMPRESSION:   1. No evidence of acute large vessel infarction.   2. No evidence of acute intracranial hemorrhage, extraaxial fluid or   midline   shift.   3. Mild cerebral atrophy.   4. 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    < end of copied text > 64F with a PMH significant for COPD and total protocolectomy S/P ileostomy who presented to [facility] with a 1 week history of persistent fevers and abdominal pain. Upon arrival to the ED she was found to be febrile (T 102.4F) and tachycardic(103), meeting the criteria for sepsis. The protocol was initiated, Cultures were drawn, she was given a dose of Vancomycin and IVFs were initiated. RVP/CT Head was negative, CXR was non conclusive, however CT chest revealed mild bilateral lower lobe opacities. She was then admitted to the floor for fevers of unknown origin. In addition to the primary medical team she was followed by .... On the floor she became septic again, managed on the protocol.          Subjective: Pt was seen and examined at bedside this morning. Per the nurse, pt became anxious after receiving her nebulizer treatment. Her anxiety persisted requiring a one time .25 dose of Xanex. This morning the pt also reports anxiety and tremulousness. Although she has remained afebrile for over 36hours and her symptoms have almost resolved she states she is to anxious to be discharged home. She also reports a frontal HA that recently began only after admission to the hospital. Will monitor her for an additional night prior to discharge home.     MEDICATIONS  (STANDING):  buDESOnide  80 MICROgram(s)/formoterol 4.5 MICROgram(s) Inhaler 2 Puff(s) Inhalation two times a day  cholecalciferol 1000 Unit(s) Oral daily  heparin  Injectable 5000 Unit(s) SubCutaneous every 8 hours  influenza   Vaccine 0.5 milliLiter(s) IntraMuscular once  pantoprazole  Injectable 40 milliGRAM(s) IV Push daily  tamoxifen 20 milliGRAM(s) Oral daily    MEDICATIONS  (PRN):  acetaminophen   Tablet .. 650 milliGRAM(s) Oral every 6 hours PRN Temp greater or equal to 38C (100.4F), Mild Pain (1 - 3)  ALPRAZolam 0.25 milliGRAM(s) Oral three times a day PRN Anxiety  ondansetron Injectable 4 milliGRAM(s) IV Push every 6 hours PRN Nausea        Vital Signs Last 24 Hrs  T(C): 36.6 (17 Oct 2019 11:20), Max: 37.1 (17 Oct 2019 04:31)  T(F): 97.9 (17 Oct 2019 11:20), Max: 98.7 (17 Oct 2019 04:31)  HR: 86 (17 Oct 2019 11:20) (73 - 97)  BP: 122/78 (17 Oct 2019 11:20) (106/53 - 157/80)  RR: 18 (17 Oct 2019 11:20) (18 - 18)  SpO2: 94% (17 Oct 2019 11:20) (93% - 96%)    GEN: NAD, comfortable, resting in bed  HEENT: NC/AT, EOMI, PERRLA, MMM, clear conjunctiva and sclera, normal hearing, no nasal discharge, throat clear, no thrush, normal dentition.   NECK: supple, no JVD, no LAD, no thyromegaly  BACK:  ROM intact, no spinal/paraspinal tenderness  CV: S1S2, RRR, no mumur  RESP: good air movement, CTABL, no rales, rhonchi or wheezing, respirations unlabored  ABD: +BS, soft, ND, +tenderness to palpation of the epigastric region, no guarding, no rigidity, no HSM, +LLQ ileostomy bag no drainage, erythema surrounding bag  EXT: +2 radial and pedal pulses, no edema, no calve tenderness  SKIN: No visible Rashes or Ulcers  MSK: ROM intact in all extremities  NEURO:  sensory and CN 2-12 Grossly intact, no motor deficits, no, saddle anesthesia, AOx3  PSYCH: normal behavior       Labs:                       9.8    9.16  )-----------( 186      ( 17 Oct 2019 07:47 )             29.2     17 Oct 2019 07:47    143    |  114    |  10     ----------------------------<  98     3.0     |  17     |  0.85     Ca    7.5        17 Oct 2019 07:47    CAPILLARY BLOOD GLUCOSE  POCT Blood Glucose.: 121 mg/dL (17 Oct 2019 11:53)  POCT Blood Glucose.: 101 mg/dL (17 Oct 2019 07:46)                   RADIOLOGY:  < from: Xray Chest 1 View-PORTABLE IMMEDIATE (10.14.19 @ 21:39) >  IMPRESSION:    Right axillary surgical clips.   The lungs are clear.  No pleural abnormality is seen, however the left   costophrenic angle is cut off from view.    Cardiomediastinal silhouette is intact.  < from: CT Head No Cont (10.15.19 @ 03:16) >  IMPRESSION:   1. No evidence of acute large vessel infarction.   2. No evidence of acute intracranial hemorrhage, extraaxial fluid or   midline shift.   3. Mild cerebral atrophy.   4. 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  < from: CT Chest No Cont (10.15.19 @ 03:17) >  IMPRESSION:   No evidence of pneumonia.  Emphysema and old granulomatous disease.  Small pulmonary nodules largest measuring 4 mm in the right lower lobe.   Follow-up noncontrast low-dose CT scan of the chest in 12 months is   recommended.  < from: US Abdomen Complete (10.15.19 @ 13:08) >  Fatty infiltration of the liver.  Cholelithiasis, without acute cholecystitis or biliary ductal dilatation.    < from: CT Head No Cont (10.15.19 @ 03:16) >  IMPRESSION:   1. No evidence of acute large vessel infarction.   2. No evidence of acute intracranial hemorrhage, extraaxial fluid or   midline   shift.   3. Mild cerebral atrophy.   4. 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

## 2019-10-18 ENCOUNTER — TRANSCRIPTION ENCOUNTER (OUTPATIENT)
Age: 64
End: 2019-10-18

## 2019-10-18 VITALS
OXYGEN SATURATION: 96 % | TEMPERATURE: 98 F | DIASTOLIC BLOOD PRESSURE: 69 MMHG | RESPIRATION RATE: 18 BRPM | SYSTOLIC BLOOD PRESSURE: 103 MMHG | HEART RATE: 79 BPM

## 2019-10-18 LAB
ANION GAP SERPL CALC-SCNC: 10 MMOL/L — SIGNIFICANT CHANGE UP (ref 5–17)
ANION GAP SERPL CALC-SCNC: 13 MMOL/L — SIGNIFICANT CHANGE UP (ref 5–17)
BUN SERPL-MCNC: 13 MG/DL — SIGNIFICANT CHANGE UP (ref 7–23)
BUN SERPL-MCNC: 18 MG/DL — SIGNIFICANT CHANGE UP (ref 7–23)
CALCIUM SERPL-MCNC: 8.5 MG/DL — SIGNIFICANT CHANGE UP (ref 8.5–10.1)
CALCIUM SERPL-MCNC: 8.8 MG/DL — SIGNIFICANT CHANGE UP (ref 8.5–10.1)
CHLORIDE SERPL-SCNC: 110 MMOL/L — HIGH (ref 96–108)
CHLORIDE SERPL-SCNC: 111 MMOL/L — HIGH (ref 96–108)
CO2 SERPL-SCNC: 19 MMOL/L — LOW (ref 22–31)
CO2 SERPL-SCNC: 20 MMOL/L — LOW (ref 22–31)
CREAT SERPL-MCNC: 0.89 MG/DL — SIGNIFICANT CHANGE UP (ref 0.5–1.3)
CREAT SERPL-MCNC: 0.91 MG/DL — SIGNIFICANT CHANGE UP (ref 0.5–1.3)
GLUCOSE SERPL-MCNC: 101 MG/DL — HIGH (ref 70–99)
GLUCOSE SERPL-MCNC: 81 MG/DL — SIGNIFICANT CHANGE UP (ref 70–99)
HCT VFR BLD CALC: 30.1 % — LOW (ref 34.5–45)
HGB BLD-MCNC: 10.3 G/DL — LOW (ref 11.5–15.5)
MAGNESIUM SERPL-MCNC: 1.8 MG/DL — SIGNIFICANT CHANGE UP (ref 1.6–2.6)
MCHC RBC-ENTMCNC: 31.6 PG — SIGNIFICANT CHANGE UP (ref 27–34)
MCHC RBC-ENTMCNC: 34.2 GM/DL — SIGNIFICANT CHANGE UP (ref 32–36)
MCV RBC AUTO: 92.3 FL — SIGNIFICANT CHANGE UP (ref 80–100)
PHOSPHATE SERPL-MCNC: 1.8 MG/DL — LOW (ref 2.5–4.5)
PHOSPHATE SERPL-MCNC: 2.5 MG/DL — SIGNIFICANT CHANGE UP (ref 2.5–4.5)
PLATELET # BLD AUTO: 237 K/UL — SIGNIFICANT CHANGE UP (ref 150–400)
POTASSIUM SERPL-MCNC: 3.1 MMOL/L — LOW (ref 3.5–5.3)
POTASSIUM SERPL-MCNC: 3.1 MMOL/L — LOW (ref 3.5–5.3)
POTASSIUM SERPL-SCNC: 3.1 MMOL/L — LOW (ref 3.5–5.3)
POTASSIUM SERPL-SCNC: 3.1 MMOL/L — LOW (ref 3.5–5.3)
RBC # BLD: 3.26 M/UL — LOW (ref 3.8–5.2)
RBC # FLD: 13.8 % — SIGNIFICANT CHANGE UP (ref 10.3–14.5)
SODIUM SERPL-SCNC: 141 MMOL/L — SIGNIFICANT CHANGE UP (ref 135–145)
SODIUM SERPL-SCNC: 142 MMOL/L — SIGNIFICANT CHANGE UP (ref 135–145)
WBC # BLD: 8.09 K/UL — SIGNIFICANT CHANGE UP (ref 3.8–10.5)
WBC # FLD AUTO: 8.09 K/UL — SIGNIFICANT CHANGE UP (ref 3.8–10.5)

## 2019-10-18 PROCEDURE — 99223 1ST HOSP IP/OBS HIGH 75: CPT

## 2019-10-18 RX ORDER — TRAMADOL HYDROCHLORIDE 50 MG/1
25 TABLET ORAL ONCE
Refills: 0 | Status: DISCONTINUED | OUTPATIENT
Start: 2019-10-18 | End: 2019-10-18

## 2019-10-18 RX ORDER — SODIUM,POTASSIUM PHOSPHATES 278-250MG
1 POWDER IN PACKET (EA) ORAL
Refills: 0 | Status: DISCONTINUED | OUTPATIENT
Start: 2019-10-18 | End: 2019-10-18

## 2019-10-18 RX ORDER — PANTOPRAZOLE SODIUM 20 MG/1
1 TABLET, DELAYED RELEASE ORAL
Qty: 10 | Refills: 0
Start: 2019-10-18 | End: 2019-10-27

## 2019-10-18 RX ADMIN — HEPARIN SODIUM 5000 UNIT(S): 5000 INJECTION INTRAVENOUS; SUBCUTANEOUS at 06:06

## 2019-10-18 RX ADMIN — Medication 1000 UNIT(S): at 11:43

## 2019-10-18 RX ADMIN — PANTOPRAZOLE SODIUM 40 MILLIGRAM(S): 20 TABLET, DELAYED RELEASE ORAL at 11:43

## 2019-10-18 RX ADMIN — TRAMADOL HYDROCHLORIDE 25 MILLIGRAM(S): 50 TABLET ORAL at 02:30

## 2019-10-18 RX ADMIN — Medication 1 TABLET(S): at 16:24

## 2019-10-18 RX ADMIN — TRAMADOL HYDROCHLORIDE 25 MILLIGRAM(S): 50 TABLET ORAL at 01:30

## 2019-10-18 RX ADMIN — Medication 1 TABLET(S): at 07:02

## 2019-10-18 RX ADMIN — Medication 1 TABLET(S): at 11:43

## 2019-10-18 RX ADMIN — Medication 0.25 MILLIGRAM(S): at 01:29

## 2019-10-18 RX ADMIN — TAMOXIFEN CITRATE 20 MILLIGRAM(S): 20 TABLET, FILM COATED ORAL at 11:43

## 2019-10-18 NOTE — PROGRESS NOTE ADULT - PROVIDER SPECIALTY LIST ADULT
Family Medicine
Hospitalist
Hospitalist
Infectious Disease
Hospitalist
Infectious Disease

## 2019-10-18 NOTE — PROGRESS NOTE ADULT - SUBJECTIVE AND OBJECTIVE BOX
64F with a PMH significant for COPD and total protocolectomy S/P ileostomy who presented to [facility] with a 1 week history of persistent fevers and abdominal pain. Upon arrival to the ED she was found to be febrile (T 102.4F) and tachycardic(103), meeting the criteria for sepsis. The protocol was initiated, Cultures were drawn, she was given a dose of Vancomycin and IVFs were initiated. RVP/CT Head was negative, CXR was non conclusive, however CT chest revealed mild bilateral lower lobe opacities. She was then admitted to the floor for fevers of unknown origin. In addition to the primary medical team she was followed by Infectious disease. On the floor she became septic again, managed on the protocol.          Subjective: Pt was seen and examined at bedside this morning. Per the nurse, pts headache grew refractory to Tylenol Night team gave her a dose of tramadol which resolved her symptoms, this morning the pt states she was also anxious over night requesting another dose of Xanax. Pt was advised that this may be an underlying disorder, and unrelated to her current symptoms and that her HA may be symptoms of her hypoK.     MEDICATIONS  (STANDING):  buDESOnide  80 MICROgram(s)/formoterol 4.5 MICROgram(s) Inhaler 2 Puff(s) Inhalation two times a day  cholecalciferol 1000 Unit(s) Oral daily  heparin  Injectable 5000 Unit(s) SubCutaneous every 8 hours  influenza   Vaccine 0.5 milliLiter(s) IntraMuscular once  pantoprazole  Injectable 40 milliGRAM(s) IV Push daily  potassium acid phosphate/sodium acid phosphate tablet (K-PHOS No. 2) 1 Tablet(s) Oral four times a day with meals  tamoxifen 20 milliGRAM(s) Oral daily    MEDICATIONS  (PRN):  acetaminophen   Tablet .. 650 milliGRAM(s) Oral every 6 hours PRN Temp greater or equal to 38C (100.4F), Mild Pain (1 - 3)  ALPRAZolam 0.25 milliGRAM(s) Oral three times a day PRN Anxiety  ondansetron Injectable 4 milliGRAM(s) IV Push every 6 hours PRN Nausea      Vital Signs Last 24 Hrs  T(C): 36.8 (18 Oct 2019 05:43), Max: 37.5 (17 Oct 2019 20:36)  T(F): 98.3 (18 Oct 2019 05:43), Max: 99.5 (17 Oct 2019 20:36)  HR: 77 (18 Oct 2019 05:43) (77 - 95)  BP: 123/69 (18 Oct 2019 05:43) (122/78 - 139/71)  RR: 18 (18 Oct 2019 05:43) (18 - 18)  SpO2: 94% (18 Oct 2019 05:43) (94% - 94%)    GEN: NAD, comfortable, resting in bed  HEENT: NC/AT, EOMI, PERRLA, MMM, clear conjunctiva and sclera, normal hearing, no nasal discharge, throat clear, no thrush, normal dentition.   NECK: supple, no JVD, no LAD, no thyromegaly  BACK:  ROM intact, no spinal/paraspinal tenderness  CV: S1S2, RRR, no mumur  RESP: good air movement, CTABL, no rales, rhonchi or wheezing, respirations unlabored  ABD: +BS, soft, ND, +tenderness to palpation of the epigastric region, no guarding, no rigidity, no HSM, +LLQ ileostomy bag no drainage, erythema surrounding bag  EXT: +2 radial and pedal pulses, no edema, no calve tenderness  SKIN: No visible Rashes or Ulcers  MSK: ROM intact in all extremities  NEURO:  sensory and CN 2-12 Grossly intact, no motor deficits, no, saddle anesthesia, AOx3  PSYCH: normal behavior         Phosphorus Level, Serum (10.17.19 @ 17:38)    Phosphorus Level, Serum: 1.1 mg/dL      CAPILLARY BLOOD GLUCOSE  POCT Blood Glucose.: 121 mg/dL (17 Oct 2019 11:53)  POCT Blood Glucose.: 101 mg/dL (17 Oct 2019 07:46)                   RADIOLOGY:  < from: Xray Chest 1 View-PORTABLE IMMEDIATE (10.14.19 @ 21:39) >  IMPRESSION:    Right axillary surgical clips.   The lungs are clear.  No pleural abnormality is seen, however the left   costophrenic angle is cut off from view.    Cardiomediastinal silhouette is intact.  < from: CT Head No Cont (10.15.19 @ 03:16) >  IMPRESSION:   1. No evidence of acute large vessel infarction.   2. No evidence of acute intracranial hemorrhage, extraaxial fluid or   midline shift.   3. Mild cerebral atrophy.   4. 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  < from: CT Chest No Cont (10.15.19 @ 03:17) >  IMPRESSION:   No evidence of pneumonia.  Emphysema and old granulomatous disease.  Small pulmonary nodules largest measuring 4 mm in the right lower lobe.   Follow-up noncontrast low-dose CT scan of the chest in 12 months is   recommended.  < from: US Abdomen Complete (10.15.19 @ 13:08) >  Fatty infiltration of the liver.  Cholelithiasis, without acute cholecystitis or biliary ductal dilatation.    < from: CT Head No Cont (10.15.19 @ 03:16) >  IMPRESSION:   1. No evidence of acute large vessel infarction.   2. No evidence of acute intracranial hemorrhage, extraaxial fluid or   midline   shift.   3. Mild cerebral atrophy.   4. 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

## 2019-10-18 NOTE — DISCHARGE NOTE NURSING/CASE MANAGEMENT/SOCIAL WORK - PATIENT PORTAL LINK FT
You can access the FollowMyHealth Patient Portal offered by [facility] by registering at the following website: [email or web address]. By joining [employer] [facility]’s FollowMyHealth portal, you will also be able to view your health information using other applications (apps) compatible with our system.

## 2019-10-18 NOTE — PROGRESS NOTE ADULT - ASSESSMENT
64F with a PMH significant for COPD and total colectomy S/P ileostomy who presented to [facility] with a 1 week history of persistent fevers and abdominal pain admitted for community acquired pneumonia.       #Fever of unknown origin likely 2/2 Viral Enterocolitis   - Sepsis, Resolved   - ID: d/c IV Zosyn; possible febrile syndrome vs Viral Enterocolitis   - Blood and urine cultures: NGT  - RVP/CMV/Toxo/Lyme: negative  - CT abd/pelvis: Fatty liver infiltrates, cholelithiasis w/o cholecystitis    - NO RECTAL TEMPERATURES in the setting of Status post total proctocolectomy  - Chest CT: Emphysema old granulomatous d/s; no pNA  - Maintenance IVF at 125cc/hr   - Strict I/Os    #Headaches  -PRN Tramadol, as Tylenol provides no relief    -Refractory K repletion Phosphate 1.1; initiate KPhos f/u Serum BMP/Phos    #Hx of  recurrent Breast cancer s/p R mastectomy  - Continue Tamoxifen   - CT head: negative for acute pathology or mets     #DVT ppx  - Heparin sub Q in the setting of tamoxifen use and limited mobility due to malaise    #Advanced Care Planning   - Code Status: FULL CODE, MOLST signed    #Disposition  -Stable for discharge home

## 2019-10-18 NOTE — PROVIDER CONTACT NOTE (MEDICATION) - SITUATION
Pt is having headache with no relieve from tylenol. Tramadol order for one time dose. Pt had partial relief at that time. Pt requested another dose with xanax

## 2019-10-18 NOTE — CONSULT NOTE ADULT - SUBJECTIVE AND OBJECTIVE BOX
Patient is a 64y old  Female who presents with a chief complaint of Persistent Fevers. Neurology was asked to consult for headaches.       HPI:  64F with COPD (not on O2/steroids), Prediabetes (2017 a1c =5.6), Crohn's, hx of R breast cancer s/p mastectomy now on tamoxifen presented to  ED with concerns regarding 4 days of progressively worsening fever. Tmax of 104.1 on day of presentation. Motrin has been abating the fevers periodically.   While in the hospital, a source of fever has not been found and it is felt to be viral.    She did have a frontal headache upon arrival which she attributed to fever.  She does not have a history of frequent headaches or migraines.    Yesterday, her headache became more intense and was bifrontal with some superimposed left sided stabbing pains.  She had no associated photophobia, phonophobia, nausea, vomiting, diplopia, blurry vision.  Today her headache is improved although she has some intermittent pains on the left that cause her to wince.  She denies having any jaw claudication.    PAST MEDICAL & SURGICAL HISTORY:  Osteopenia  S/P radiation therapy: Hx of radiation therapy 1991  rt breast    H/o Lyme disease: PATIENT STATED THAT SHE WAS DIAGNOSED IN SEPT 2014    Gastritis  COPD without exacerbation  Breast cancer  H/O ileostomy  History of bowel resection  H/O mastectomy, right      FAMILY HISTORY:  Family hx of prostate cancer  Paternal family history of emphysema  FHx: diabetes mellitus  Family history of obesity  FH: heart disease      Social Hx:  Nonsmoker, no drug or alcohol use    MEDICATIONS  (STANDING):  buDESOnide  80 MICROgram(s)/formoterol 4.5 MICROgram(s) Inhaler 2 Puff(s) Inhalation two times a day  cholecalciferol 1000 Unit(s) Oral daily  heparin  Injectable 5000 Unit(s) SubCutaneous every 8 hours  pantoprazole  Injectable 40 milliGRAM(s) IV Push daily  potassium acid phosphate/sodium acid phosphate tablet (K-PHOS No. 2) 1 Tablet(s) Oral four times a day with meals  tamoxifen 20 milliGRAM(s) Oral daily       Allergies    IV Contrast (Unknown)  Phenergan (Unknown)    Intolerances        ROS: Pertinent positives in HPI, all other ROS were reviewed and are negative.      Vital Signs Last 24 Hrs  T(C): 36.7 (18 Oct 2019 11:29), Max: 37.5 (17 Oct 2019 20:36)  T(F): 98 (18 Oct 2019 11:29), Max: 99.5 (17 Oct 2019 20:36)  HR: 79 (18 Oct 2019 11:29) (77 - 95)  BP: 103/69 (18 Oct 2019 11:29) (103/69 - 139/71)  BP(mean): --  RR: 18 (18 Oct 2019 11:29) (18 - 18)  SpO2: 96% (18 Oct 2019 11:29) (94% - 96%)        Constitutional: awake and alert.  HEENT: PERRLA, EOMI, No tortuosity of temporal arteries or tenderness over temples.  Neck: Supple, no nuchal rigidity  Respiratory: Breath sounds are clear bilaterally  Cardiovascular: S1 and S2, regular / irregular rhythm  Gastrointestinal: soft, nontender  Extremities:  no edema  Vascular: Carotid Bruit - no  Musculoskeletal: no joint swelling/tenderness, no abnormal movements  Skin: No rashes    Neurological exam:  HF: A x O x 3. Appropriately interactive, normal affect. Speech fluent, No Aphasia or paraphasic errors. Naming /repetition intact   CN: DAX, EOMI, VFF, facial sensation normal, no NLFD, tongue midline, Palate moves equally, SCM equal bilaterally  Motor: No pronator drift, Strength 5/5 in all 4 ext, normal bulk and tone, no tremor, rigidity or bradykinesia.    Sens: Intact to light touch   Reflexes: Symmetric and normal . BJ 2, BR 2, KJ 2, AJ 1, downgoing toes b/l  Coord:  No FNFA, dysmetria, ASHLEY intact           Labs:   10-18    142  |  110<H>  |  13  ----------------------------<  81  3.1<L>   |  19<L>  |  0.89    Ca    8.5      18 Oct 2019 07:54  Phos  1.8     10-18  Mg     1.8     10-18        10-15 [id number] 5.9                          10.3   8.09  )-----------( 237      ( 18 Oct 2019 07:54 )             30.1       Radiology:    CT head no contrast 10/15/19:  1. No evidence of acute large vessel infarction.   2. No evidence of acute intracranial hemorrhage, extraaxial fluid or   midline   shift.   3. Mild cerebral atrophy.   4. 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      ESR 10/16/19: 48

## 2019-10-18 NOTE — PROGRESS NOTE ADULT - REASON FOR ADMISSION
Persistent Fevers

## 2019-10-18 NOTE — PROGRESS NOTE ADULT - SUBJECTIVE AND OBJECTIVE BOX
Date of service: 10-18-19 @ 08:42    OOB to chair  Tmax 99.5F  Feels better  No abdominal pain    ROS: no fever or chills; denies dizziness, had HA overnight - resolved at present, no SOB or cough, no dysuria, no legs pain, no rashes    MEDICATIONS  (STANDING):  buDESOnide  80 MICROgram(s)/formoterol 4.5 MICROgram(s) Inhaler 2 Puff(s) Inhalation two times a day  cholecalciferol 1000 Unit(s) Oral daily  heparin  Injectable 5000 Unit(s) SubCutaneous every 8 hours  influenza   Vaccine 0.5 milliLiter(s) IntraMuscular once  pantoprazole  Injectable 40 milliGRAM(s) IV Push daily  potassium acid phosphate/sodium acid phosphate tablet (K-PHOS No. 2) 1 Tablet(s) Oral four times a day with meals  tamoxifen 20 milliGRAM(s) Oral daily      Vital Signs Last 24 Hrs  T(C): 36.8 (18 Oct 2019 05:43), Max: 37.5 (17 Oct 2019 20:36)  T(F): 98.3 (18 Oct 2019 05:43), Max: 99.5 (17 Oct 2019 20:36)  HR: 77 (18 Oct 2019 05:43) (77 - 95)  BP: 123/69 (18 Oct 2019 05:43) (122/78 - 139/71)  BP(mean): --  RR: 18 (18 Oct 2019 05:43) (18 - 18)  SpO2: 94% (18 Oct 2019 05:43) (94% - 94%)    Physical Exam:      Constitutional: frail looking  HEENT: NC/AT, EOMI, PERRLA, conjunctivae clear  Neck: supple; thyroid not palpable  Back: no tenderness  Respiratory: respiratory effort normal; clear to auscultation  Cardiovascular: S1S2 regular, no murmurs  Abdomen: soft, not tender, not distended, positive BS  colostomy  Genitourinary: no suprapubic tenderness  Lymphatic: no LN palpable  Musculoskeletal: no muscle tenderness, no joint swelling or tenderness  Extremities: no pedal edema  Neurological/ Psychiatric: AxOx3, moving all extremities  Skin: no rashes; no palpable lesions    Labs: reviewed                        10.3   8.09  )-----------( 237      ( 18 Oct 2019 07:54 )             30.1     10-17    143  |  114<H>  |  10  ----------------------------<  98  3.0<L>   |  17<L>  |  0.85    Ca    7.5<L>      17 Oct 2019 07:47  Phos  1.1     10-17  Mg     1.6     10-                        9.7    4.15  )-----------( 143      ( 16 Oct 2019 07:16 )             28.9     10-16    143  |  113<H>  |  7   ----------------------------<  105<H>  3.0<L>   |  20<L>  |  1.12    Ca    7.6<L>      16 Oct 2019 07:16  Phos  2.7     10-15  Mg     1.7     10-15    TPro  6.0  /  Alb  2.5<L>  /  TBili  0.5  /  DBili  x   /  AST  30  /  ALT  26  /  AlkPhos  44  10-15     LIVER FUNCTIONS - ( 15 Oct 2019 07:28 )  Alb: 2.5 g/dL / Pro: 6.0 gm/dL / ALK PHOS: 44 U/L / ALT: 26 U/L / AST: 30 U/L / GGT: x           Urinalysis Basic - ( 14 Oct 2019 22:45 )    Color: Yellow / Appearance: Clear / S.010 / pH: x  Gluc: x / Ketone: Negative  / Bili: Negative / Urobili: Negative mg/dL   Blood: x / Protein: 100 mg/dL / Nitrite: Negative   Leuk Esterase: Negative / RBC: 0-2 /HPF / WBC 3-5   Sq Epi: x / Non Sq Epi: Few / Bacteria: Few      Babesia microti PCR, Blood. (collected 16 Oct 2019 10:22)  Source: .Blood None  Final Report (16 Oct 2019 20:37):    Babesia microti PCR    Results: NOT detected    GI PCR Panel, Stool (collected 16 Oct 2019 09:47)  Source: .Stool Feces  Final Report (16 Oct 2019 21:19):    GI PCR Results: NOT detected    *******Please Note:*******    GI panel PCR evaluates for:    Campylobacter, Plesiomonas shigelloides, Salmonella,    Vibrio, Yersinia enterocolitica, Enteroaggregative    Escherichia coli (EAEC), Enteropathogenic E.coli (EPEC),    Enterotoxigenic E. coli (ETEC) lt/st, Shiga-like    toxin-producing E. coli (STEC) stx1/stx2,    Shigella/ Enteroinvasive E. coli (EIEC), Cryptosporidium,    Cyclospora cayetanensis, Entamoeba histolytica,    Giardia lamblia, Adenovirus F 40/41, Astrovirus,    Norovirus GI/GII, Rotavirus A, Sapovirus    Culture - Urine (collected 14 Oct 2019 22:45)  Source: .Urine Clean Catch (Midstream)  Final Report (16 Oct 2019 08:18):    No growth    Culture - Blood (collected 14 Oct 2019 20:57)  Source: .Blood Blood-Peripheral  Preliminary Report (16 Oct 2019 03:01):    No growth to date.    Culture - Blood (collected 14 Oct 2019 20:57)  Source: .Blood Blood-Peripheral  Preliminary Report (16 Oct 2019 03:01):    No growth to date.    Culture - Urine (collected 09 Oct 2019 08:46)  Source: .Urine None  Final Report (10 Oct 2019 12:30):    Normal Urogenital silvestre present        Radiology: all available radiological tests reviewed    < from: MR Abdomen No Cont (10.15.19 @ 17:34) >  1. Poor distention of the stomach. Prominence of gastric folds which   could be   secondary to underdistention or gastritis.   2. Prior colectomy with left lower quadrant ileostomy.   3. Cholelithiasis. No obvious gallbladder edema. No evidence of biliary   obstruction.   4. Hepatomegaly with fatty infiltration of the liver.    < end of copied text >    < from: US Abdomen Complete (10.15.19 @ 13:08) >  Fatty infiltration of the liver.  Cholelithiasis, without acute cholecystitis or biliary ductal dilatation.    < end of copied text >      Advanced directives addressed: full resuscitation

## 2019-10-18 NOTE — PROGRESS NOTE ADULT - ASSESSMENT
65 y/o Female with h/o COPD (not on O2/steroids), pre-diabetes, Crohn's disease s/p colostomy, right breast cancer s/p mastectomy on tamoxifen was admitted on 10/14 for fever x 4 days. Febrile to Tmax of 104.1 on day of presentation. Motrin has been abating the fever. Pt states she went to an Urgent Care the day prior to presentation and was given a course of amoxacillin for which she has had 3 doses. She has headache, nonradiating, 6-8/10 intensity x one day. Has decreased appetite, feelings of "rumbling in my stomach", fatigue. She was seen 10/9 in ED w/complaints of abdominal pain. +UA in the setting of many epithelial cells; Ucx negative. CT abd/pelvis w/PO contrast w/o acute pathology symptoms improved after IVF and morphine. Pt was  d/c with instructions to follow up outpt with GI. In ER she received vancomycin IV and zosyn.    1. Febrile syndrome resolving. Acute enterocolitis resolving. Crohn's disease.  -probable viral illness  -probable inflammatory syndrome  - BC x 2, urine c/s reviewed  -CMV, Lyme serology, toxo are negative  -ESR, CRP, ferritin, LDH noted  -stool for GI PCR is negative  -continue to observe off abx   -supportive care  -f/u PMD  -f/u cultures  -monitor temps  -f/u CBC  -supportive care  2. Other issues:   -care per medicine

## 2019-10-18 NOTE — CONSULT NOTE ADULT - ASSESSMENT
64 year old woman with history of COPD, pre-diabetes, breast cancer, Crohn's disease, admitted with fever of unknown origin who also complains of headaches.  She is now afebrile.  Headaches have improved.  Headaches are not accompanied by blurry vision. ESR of 48 is not concerning for GCA, particularly when patient was just sick with a viral illness.  No clinical suggestion of meningitis/encephalitis.  She reports significant muscle tension throughout her neck/back due to shivering in setting of fever, and thi sis likely contributing to tension headaches.  She now has a non-focal neurological examination and CT does not show any concerning acute pathology.    No need for further neurological testing at this time as patient is planned for discharge this afternoon.  Can use NSAIDs, acetaminophen for the short term, but if headaches persist she should have outpatient follow-up.

## 2019-10-20 LAB
CULTURE RESULTS: SIGNIFICANT CHANGE UP
CULTURE RESULTS: SIGNIFICANT CHANGE UP
SPECIMEN SOURCE: SIGNIFICANT CHANGE UP
SPECIMEN SOURCE: SIGNIFICANT CHANGE UP

## 2019-10-23 DIAGNOSIS — G44.209 TENSION-TYPE HEADACHE, UNSPECIFIED, NOT INTRACTABLE: ICD-10-CM

## 2019-10-23 DIAGNOSIS — K76.0 FATTY (CHANGE OF) LIVER, NOT ELSEWHERE CLASSIFIED: ICD-10-CM

## 2019-10-23 DIAGNOSIS — Z85.3 PERSONAL HISTORY OF MALIGNANT NEOPLASM OF BREAST: ICD-10-CM

## 2019-10-23 DIAGNOSIS — N17.9 ACUTE KIDNEY FAILURE, UNSPECIFIED: ICD-10-CM

## 2019-10-23 DIAGNOSIS — R91.1 SOLITARY PULMONARY NODULE: ICD-10-CM

## 2019-10-23 DIAGNOSIS — J18.9 PNEUMONIA, UNSPECIFIED ORGANISM: ICD-10-CM

## 2019-10-23 DIAGNOSIS — E87.6 HYPOKALEMIA: ICD-10-CM

## 2019-10-23 DIAGNOSIS — R73.03 PREDIABETES: ICD-10-CM

## 2019-10-23 DIAGNOSIS — A41.9 SEPSIS, UNSPECIFIED ORGANISM: ICD-10-CM

## 2019-10-23 DIAGNOSIS — J43.9 EMPHYSEMA, UNSPECIFIED: ICD-10-CM

## 2019-10-23 DIAGNOSIS — M85.80 OTHER SPECIFIED DISORDERS OF BONE DENSITY AND STRUCTURE, UNSPECIFIED SITE: ICD-10-CM

## 2019-10-23 DIAGNOSIS — K80.20 CALCULUS OF GALLBLADDER WITHOUT CHOLECYSTITIS WITHOUT OBSTRUCTION: ICD-10-CM

## 2019-10-23 DIAGNOSIS — A08.4 VIRAL INTESTINAL INFECTION, UNSPECIFIED: ICD-10-CM

## 2019-10-23 DIAGNOSIS — K50.90 CROHN'S DISEASE, UNSPECIFIED, WITHOUT COMPLICATIONS: ICD-10-CM

## 2020-12-17 NOTE — PROVIDER CONTACT NOTE (CRITICAL VALUE NOTIFICATION) - TEST AND RESULT REPORTED:
Rx request   Requested Prescriptions     Pending Prescriptions Disp Refills    vitamin B-12 (CYANOCOBALAMIN) 1000 MCG tablet 30 tablet 5     Sig: Take 1 tablet by mouth daily    aspirin EC 81 MG EC tablet 90 tablet 2     Sig: Take 1 tablet by mouth daily     LOV 12/16/2020  Next Visit Date:  Future Appointments   Date Time Provider Loan Darling   1/4/2021  1:30 PM MD [facility]   1/20/2021  1:15 PM Praveena Newell DO [address] [facility]   1/26/2021  2:00 PM [address] [place] [address], APRN - [facility] [address] [facility]   2/4/2021  1:30 PM MD [facility]   2/23/2021  2:30 PM Marry Francis MD [facility]   12/21/2021  8:00 AM Rekha Burns MD [address] [place] [address]
lactate 2.6

## 2021-10-21 PROBLEM — K29.70 GASTRITIS, UNSPECIFIED, WITHOUT BLEEDING: Chronic | Status: ACTIVE | Noted: 2019-10-15

## 2021-10-21 PROBLEM — Z86.19 PERSONAL HISTORY OF OTHER INFECTIOUS AND PARASITIC DISEASES: Chronic | Status: ACTIVE | Noted: 2019-10-15

## 2021-10-21 PROBLEM — J44.9 CHRONIC OBSTRUCTIVE PULMONARY DISEASE, UNSPECIFIED: Chronic | Status: ACTIVE | Noted: 2019-10-15

## 2021-10-21 PROBLEM — M85.80 OTHER SPECIFIED DISORDERS OF BONE DENSITY AND STRUCTURE, UNSPECIFIED SITE: Chronic | Status: ACTIVE | Noted: 2019-10-15

## 2021-10-21 PROBLEM — K50.90 CROHN'S DISEASE, UNSPECIFIED, WITHOUT COMPLICATIONS: Chronic | Status: ACTIVE | Noted: 2019-10-23

## 2021-10-21 PROBLEM — Z92.3 PERSONAL HISTORY OF IRRADIATION: Chronic | Status: ACTIVE | Noted: 2019-10-15

## 2021-12-16 ENCOUNTER — APPOINTMENT (OUTPATIENT)
Dept: UROGYNECOLOGY | Facility: CLINIC | Age: 66
End: 2021-12-16

## 2021-12-23 NOTE — ED ADULT NURSE NOTE - NS PRO AD BILL OF RIGHTS
"When opening a documentation only encounter, be sure to enter in \"Chief Complaint\" Forms and in \" Comments\" Title of form, description if needed.    Sudeep is a 89 year old  female  Form received via: Fax  Form now resides in: Provider Ready    Veronica Gaffney MA    Form has been completed by provider.     Form sent out via: Fax to [employer] at Fax Number: [phone]  Patient informed: No, Reason:n/a  Output date: December 23, 2021    Veronica Gaffney MA      **Please close the encounter**                    " Yes

## 2022-01-27 PROBLEM — Z00.00 ENCOUNTER FOR PREVENTIVE HEALTH EXAMINATION: Status: ACTIVE | Noted: 2022-01-27

## 2022-02-15 ENCOUNTER — APPOINTMENT (OUTPATIENT)
Dept: UROGYNECOLOGY | Facility: CLINIC | Age: 67
End: 2022-02-15
Payer: MEDICARE

## 2022-02-15 VITALS — SYSTOLIC BLOOD PRESSURE: 131 MMHG | TEMPERATURE: 97.5 F | HEART RATE: 67 BPM | DIASTOLIC BLOOD PRESSURE: 85 MMHG

## 2022-02-15 DIAGNOSIS — R35.1 NOCTURIA: ICD-10-CM

## 2022-02-15 PROCEDURE — 99205 OFFICE O/P NEW HI 60 MIN: CPT

## 2022-02-15 PROCEDURE — 83986 ASSAY PH BODY FLUID NOS: CPT | Mod: QW

## 2022-02-15 RX ORDER — ALPRAZOLAM 0.5 MG/1
0.5 TABLET ORAL
Refills: 0 | Status: ACTIVE | COMMUNITY
Start: 2022-02-15

## 2022-02-15 RX ORDER — SULFASALAZINE 500 MG/1
500 TABLET, DELAYED RELEASE ORAL
Refills: 0 | Status: COMPLETED | COMMUNITY
Start: 2022-02-15 | End: 2022-02-15

## 2022-02-15 RX ORDER — PREDNISONE 5 MG/1
5 TABLET ORAL
Refills: 0 | Status: COMPLETED | COMMUNITY
Start: 2022-02-15 | End: 2022-02-15

## 2022-02-15 RX ORDER — CLINDAMYCIN PHOSPHATE 20 MG/G
2 CREAM VAGINAL
Qty: 1 | Refills: 0 | Status: ACTIVE | COMMUNITY
Start: 2022-02-15 | End: 1900-01-01

## 2022-02-15 RX ORDER — FLUCONAZOLE 150 MG/1
150 TABLET ORAL
Qty: 2 | Refills: 0 | Status: ACTIVE | COMMUNITY
Start: 2022-02-15 | End: 1900-01-01

## 2022-02-16 LAB
APPEARANCE: CLEAR
BACTERIA: ABNORMAL
BILIRUBIN URINE: NEGATIVE
BLOOD URINE: NEGATIVE
COLOR: NORMAL
GLUCOSE QUALITATIVE U: NEGATIVE
HYALINE CASTS: 0 /LPF
KETONES URINE: NEGATIVE
LEUKOCYTE ESTERASE URINE: ABNORMAL
MICROSCOPIC-UA: NORMAL
NITRITE URINE: NEGATIVE
PH URINE: 6
PROTEIN URINE: ABNORMAL
RED BLOOD CELLS URINE: 2 /HPF
SPECIFIC GRAVITY URINE: 1.02
SQUAMOUS EPITHELIAL CELLS: 3 /HPF
UROBILINOGEN URINE: NORMAL
WHITE BLOOD CELLS URINE: 87 /HPF

## 2022-02-18 LAB
A VAGINAE DNA VAG QL NAA+PROBE: NORMAL
BACTERIA UR CULT: NORMAL
BVAB2 DNA VAG QL NAA+PROBE: NORMAL
C KRUSEI DNA VAG QL NAA+PROBE: NEGATIVE
C TRACH RRNA SPEC QL NAA+PROBE: NEGATIVE
MEGA1 DNA VAG QL NAA+PROBE: NORMAL
N GONORRHOEA RRNA SPEC QL NAA+PROBE: NEGATIVE
T VAGINALIS RRNA SPEC QL NAA+PROBE: NEGATIVE

## 2022-04-12 ENCOUNTER — APPOINTMENT (OUTPATIENT)
Dept: UROGYNECOLOGY | Facility: CLINIC | Age: 67
End: 2022-04-12
Payer: MEDICARE

## 2022-04-12 DIAGNOSIS — N90.5 ATROPHY OF VULVA: ICD-10-CM

## 2022-04-12 DIAGNOSIS — N94.10 UNSPECIFIED DYSPAREUNIA: ICD-10-CM

## 2022-04-12 DIAGNOSIS — N95.2 POSTMENOPAUSAL ATROPHIC VAGINITIS: ICD-10-CM

## 2022-04-12 DIAGNOSIS — R10.2 PELVIC AND PERINEAL PAIN: ICD-10-CM

## 2022-04-12 PROCEDURE — 83986 ASSAY PH BODY FLUID NOS: CPT | Mod: QW

## 2022-04-12 PROCEDURE — 99214 OFFICE O/P EST MOD 30 MIN: CPT

## 2022-06-24 ENCOUNTER — APPOINTMENT (OUTPATIENT)
Dept: UROGYNECOLOGY | Facility: CLINIC | Age: 67
End: 2022-06-24

## 2023-01-03 ENCOUNTER — NON-APPOINTMENT (OUTPATIENT)
Age: 68
End: 2023-01-03

## 2023-01-10 ENCOUNTER — APPOINTMENT (OUTPATIENT)
Dept: UROGYNECOLOGY | Facility: CLINIC | Age: 68
End: 2023-01-10

## 2023-01-10 ENCOUNTER — NON-APPOINTMENT (OUTPATIENT)
Age: 68
End: 2023-01-10

## 2023-01-22 ENCOUNTER — NON-APPOINTMENT (OUTPATIENT)
Age: 68
End: 2023-01-22

## 2023-01-31 ENCOUNTER — NON-APPOINTMENT (OUTPATIENT)
Age: 68
End: 2023-01-31

## 2023-03-11 ENCOUNTER — EMERGENCY (EMERGENCY)
Facility: HOSPITAL | Age: 68
LOS: 0 days | Discharge: HOME | End: 2023-03-11
Attending: EMERGENCY MEDICINE
Payer: MEDICARE

## 2023-03-11 VITALS
HEART RATE: 70 BPM | WEIGHT: 128.09 LBS | OXYGEN SATURATION: 100 % | RESPIRATION RATE: 18 BRPM | TEMPERATURE: 98 F | DIASTOLIC BLOOD PRESSURE: 77 MMHG | SYSTOLIC BLOOD PRESSURE: 130 MMHG

## 2023-03-11 VITALS
DIASTOLIC BLOOD PRESSURE: 55 MMHG | HEART RATE: 66 BPM | SYSTOLIC BLOOD PRESSURE: 114 MMHG | RESPIRATION RATE: 18 BRPM | OXYGEN SATURATION: 100 %

## 2023-03-11 DIAGNOSIS — N20.0 CALCULUS OF KIDNEY: ICD-10-CM

## 2023-03-11 DIAGNOSIS — M62.838 OTHER MUSCLE SPASM: ICD-10-CM

## 2023-03-11 DIAGNOSIS — N28.1 CYST OF KIDNEY, ACQUIRED: ICD-10-CM

## 2023-03-11 DIAGNOSIS — K80.20 CALCULUS OF GALLBLADDER WITHOUT CHOLECYSTITIS WITHOUT OBSTRUCTION: ICD-10-CM

## 2023-03-11 DIAGNOSIS — K76.0 FATTY (CHANGE OF) LIVER, NOT ELSEWHERE CLASSIFIED: ICD-10-CM

## 2023-03-11 DIAGNOSIS — R79.9 ABNORMAL FINDING OF BLOOD CHEMISTRY, UNSPECIFIED: ICD-10-CM

## 2023-03-11 DIAGNOSIS — Z98.890 OTHER SPECIFIED POSTPROCEDURAL STATES: Chronic | ICD-10-CM

## 2023-03-11 DIAGNOSIS — Z88.8 ALLERGY STATUS TO OTHER DRUGS, MEDICAMENTS AND BIOLOGICAL SUBSTANCES: ICD-10-CM

## 2023-03-11 DIAGNOSIS — M85.80 OTHER SPECIFIED DISORDERS OF BONE DENSITY AND STRUCTURE, UNSPECIFIED SITE: ICD-10-CM

## 2023-03-11 DIAGNOSIS — Z85.3 PERSONAL HISTORY OF MALIGNANT NEOPLASM OF BREAST: ICD-10-CM

## 2023-03-11 DIAGNOSIS — Z90.49 ACQUIRED ABSENCE OF OTHER SPECIFIED PARTS OF DIGESTIVE TRACT: ICD-10-CM

## 2023-03-11 DIAGNOSIS — Z90.11 ACQUIRED ABSENCE OF RIGHT BREAST AND NIPPLE: Chronic | ICD-10-CM

## 2023-03-11 DIAGNOSIS — R73.03 PREDIABETES: ICD-10-CM

## 2023-03-11 DIAGNOSIS — J44.9 CHRONIC OBSTRUCTIVE PULMONARY DISEASE, UNSPECIFIED: ICD-10-CM

## 2023-03-11 DIAGNOSIS — Z87.19 PERSONAL HISTORY OF OTHER DISEASES OF THE DIGESTIVE SYSTEM: ICD-10-CM

## 2023-03-11 DIAGNOSIS — Z90.49 ACQUIRED ABSENCE OF OTHER SPECIFIED PARTS OF DIGESTIVE TRACT: Chronic | ICD-10-CM

## 2023-03-11 DIAGNOSIS — Z91.041 RADIOGRAPHIC DYE ALLERGY STATUS: ICD-10-CM

## 2023-03-11 DIAGNOSIS — R74.8 ABNORMAL LEVELS OF OTHER SERUM ENZYMES: ICD-10-CM

## 2023-03-11 DIAGNOSIS — R10.31 RIGHT LOWER QUADRANT PAIN: ICD-10-CM

## 2023-03-11 DIAGNOSIS — R91.1 SOLITARY PULMONARY NODULE: ICD-10-CM

## 2023-03-11 DIAGNOSIS — N39.0 URINARY TRACT INFECTION, SITE NOT SPECIFIED: ICD-10-CM

## 2023-03-11 DIAGNOSIS — Z93.2 ILEOSTOMY STATUS: ICD-10-CM

## 2023-03-11 DIAGNOSIS — I70.90 UNSPECIFIED ATHEROSCLEROSIS: ICD-10-CM

## 2023-03-11 DIAGNOSIS — Z90.11 ACQUIRED ABSENCE OF RIGHT BREAST AND NIPPLE: ICD-10-CM

## 2023-03-11 DIAGNOSIS — Z92.21 PERSONAL HISTORY OF ANTINEOPLASTIC CHEMOTHERAPY: ICD-10-CM

## 2023-03-11 LAB
ALBUMIN SERPL ELPH-MCNC: 3.7 G/DL — SIGNIFICANT CHANGE UP (ref 3.3–5)
ALP SERPL-CCNC: 94 U/L — SIGNIFICANT CHANGE UP (ref 40–120)
ALT FLD-CCNC: 33 U/L — SIGNIFICANT CHANGE UP (ref 12–78)
ANION GAP SERPL CALC-SCNC: 8 MMOL/L — SIGNIFICANT CHANGE UP (ref 5–17)
APPEARANCE UR: CLEAR — SIGNIFICANT CHANGE UP
APTT BLD: 30.8 SEC — SIGNIFICANT CHANGE UP (ref 27.5–35.5)
AST SERPL-CCNC: 25 U/L — SIGNIFICANT CHANGE UP (ref 15–37)
BACTERIA # UR AUTO: ABNORMAL
BASOPHILS # BLD AUTO: 0.04 K/UL — SIGNIFICANT CHANGE UP (ref 0–0.2)
BASOPHILS NFR BLD AUTO: 0.4 % — SIGNIFICANT CHANGE UP (ref 0–2)
BILIRUB SERPL-MCNC: 0.6 MG/DL — SIGNIFICANT CHANGE UP (ref 0.2–1.2)
BILIRUB UR-MCNC: NEGATIVE — SIGNIFICANT CHANGE UP
BLD GP AB SCN SERPL QL: SIGNIFICANT CHANGE UP
BUN SERPL-MCNC: 33 MG/DL — HIGH (ref 7–23)
CALCIUM SERPL-MCNC: 9.4 MG/DL — SIGNIFICANT CHANGE UP (ref 8.5–10.1)
CHLORIDE SERPL-SCNC: 109 MMOL/L — HIGH (ref 96–108)
CO2 SERPL-SCNC: 24 MMOL/L — SIGNIFICANT CHANGE UP (ref 22–31)
COLOR SPEC: YELLOW — SIGNIFICANT CHANGE UP
CREAT SERPL-MCNC: 1.36 MG/DL — HIGH (ref 0.5–1.3)
DIFF PNL FLD: ABNORMAL
EGFR: 43 ML/MIN/1.73M2 — LOW
EOSINOPHIL # BLD AUTO: 0.14 K/UL — SIGNIFICANT CHANGE UP (ref 0–0.5)
EOSINOPHIL NFR BLD AUTO: 1.5 % — SIGNIFICANT CHANGE UP (ref 0–6)
EPI CELLS # UR: ABNORMAL
GLUCOSE SERPL-MCNC: 149 MG/DL — HIGH (ref 70–99)
GLUCOSE UR QL: NEGATIVE — SIGNIFICANT CHANGE UP
HCT VFR BLD CALC: 40.8 % — SIGNIFICANT CHANGE UP (ref 34.5–45)
HGB BLD-MCNC: 13.6 G/DL — SIGNIFICANT CHANGE UP (ref 11.5–15.5)
HYALINE CASTS # UR AUTO: ABNORMAL /LPF
IMM GRANULOCYTES NFR BLD AUTO: 0.3 % — SIGNIFICANT CHANGE UP (ref 0–0.9)
INR BLD: 0.96 RATIO — SIGNIFICANT CHANGE UP (ref 0.88–1.16)
KETONES UR-MCNC: NEGATIVE — SIGNIFICANT CHANGE UP
LEUKOCYTE ESTERASE UR-ACNC: ABNORMAL
LYMPHOCYTES # BLD AUTO: 2.06 K/UL — SIGNIFICANT CHANGE UP (ref 1–3.3)
LYMPHOCYTES # BLD AUTO: 22.8 % — SIGNIFICANT CHANGE UP (ref 13–44)
MCHC RBC-ENTMCNC: 31.4 PG — SIGNIFICANT CHANGE UP (ref 27–34)
MCHC RBC-ENTMCNC: 33.3 GM/DL — SIGNIFICANT CHANGE UP (ref 32–36)
MCV RBC AUTO: 94.2 FL — SIGNIFICANT CHANGE UP (ref 80–100)
MONOCYTES # BLD AUTO: 0.65 K/UL — SIGNIFICANT CHANGE UP (ref 0–0.9)
MONOCYTES NFR BLD AUTO: 7.2 % — SIGNIFICANT CHANGE UP (ref 2–14)
NEUTROPHILS # BLD AUTO: 6.12 K/UL — SIGNIFICANT CHANGE UP (ref 1.8–7.4)
NEUTROPHILS NFR BLD AUTO: 67.8 % — SIGNIFICANT CHANGE UP (ref 43–77)
NITRITE UR-MCNC: NEGATIVE — SIGNIFICANT CHANGE UP
PH UR: 6 — SIGNIFICANT CHANGE UP (ref 5–8)
PLATELET # BLD AUTO: 291 K/UL — SIGNIFICANT CHANGE UP (ref 150–400)
POTASSIUM SERPL-MCNC: 3.4 MMOL/L — LOW (ref 3.5–5.3)
POTASSIUM SERPL-SCNC: 3.4 MMOL/L — LOW (ref 3.5–5.3)
PROT SERPL-MCNC: 7.8 GM/DL — SIGNIFICANT CHANGE UP (ref 6–8.3)
PROT UR-MCNC: 30 MG/DL
PROTHROM AB SERPL-ACNC: 11.1 SEC — SIGNIFICANT CHANGE UP (ref 10.5–13.4)
RBC # BLD: 4.33 M/UL — SIGNIFICANT CHANGE UP (ref 3.8–5.2)
RBC # FLD: 15.2 % — HIGH (ref 10.3–14.5)
RBC CASTS # UR COMP ASSIST: SIGNIFICANT CHANGE UP /HPF (ref 0–4)
SODIUM SERPL-SCNC: 141 MMOL/L — SIGNIFICANT CHANGE UP (ref 135–145)
SP GR SPEC: 1.02 — SIGNIFICANT CHANGE UP (ref 1.01–1.02)
UROBILINOGEN FLD QL: NEGATIVE — SIGNIFICANT CHANGE UP
WBC # BLD: 9.04 K/UL — SIGNIFICANT CHANGE UP (ref 3.8–10.5)
WBC # FLD AUTO: 9.04 K/UL — SIGNIFICANT CHANGE UP (ref 3.8–10.5)
WBC UR QL: ABNORMAL /HPF (ref 0–5)

## 2023-03-11 PROCEDURE — 81001 URINALYSIS AUTO W/SCOPE: CPT

## 2023-03-11 PROCEDURE — 85025 COMPLETE CBC W/AUTO DIFF WBC: CPT

## 2023-03-11 PROCEDURE — 96375 TX/PRO/DX INJ NEW DRUG ADDON: CPT

## 2023-03-11 PROCEDURE — 86850 RBC ANTIBODY SCREEN: CPT

## 2023-03-11 PROCEDURE — 80053 COMPREHEN METABOLIC PANEL: CPT

## 2023-03-11 PROCEDURE — 85730 THROMBOPLASTIN TIME PARTIAL: CPT

## 2023-03-11 PROCEDURE — 85610 PROTHROMBIN TIME: CPT

## 2023-03-11 PROCEDURE — 76830 TRANSVAGINAL US NON-OB: CPT | Mod: 26

## 2023-03-11 PROCEDURE — 74176 CT ABD & PELVIS W/O CONTRAST: CPT | Mod: MA

## 2023-03-11 PROCEDURE — 87086 URINE CULTURE/COLONY COUNT: CPT

## 2023-03-11 PROCEDURE — 96374 THER/PROPH/DIAG INJ IV PUSH: CPT

## 2023-03-11 PROCEDURE — 99285 EMERGENCY DEPT VISIT HI MDM: CPT | Mod: FS

## 2023-03-11 PROCEDURE — 99284 EMERGENCY DEPT VISIT MOD MDM: CPT | Mod: 25

## 2023-03-11 PROCEDURE — 74176 CT ABD & PELVIS W/O CONTRAST: CPT | Mod: 26,MA

## 2023-03-11 PROCEDURE — 86901 BLOOD TYPING SEROLOGIC RH(D): CPT

## 2023-03-11 PROCEDURE — 36415 COLL VENOUS BLD VENIPUNCTURE: CPT

## 2023-03-11 PROCEDURE — 76830 TRANSVAGINAL US NON-OB: CPT

## 2023-03-11 PROCEDURE — 86900 BLOOD TYPING SEROLOGIC ABO: CPT

## 2023-03-11 RX ORDER — MORPHINE SULFATE 50 MG/1
4 CAPSULE, EXTENDED RELEASE ORAL ONCE
Refills: 0 | Status: DISCONTINUED | OUTPATIENT
Start: 2023-03-11 | End: 2023-03-11

## 2023-03-11 RX ORDER — ACETAMINOPHEN 500 MG
1000 TABLET ORAL ONCE
Refills: 0 | Status: COMPLETED | OUTPATIENT
Start: 2023-03-11 | End: 2023-03-11

## 2023-03-11 RX ORDER — METHOCARBAMOL 500 MG/1
1 TABLET, FILM COATED ORAL
Qty: 9 | Refills: 0
Start: 2023-03-11 | End: 2023-03-13

## 2023-03-11 RX ORDER — CEFTRIAXONE 500 MG/1
1000 INJECTION, POWDER, FOR SOLUTION INTRAMUSCULAR; INTRAVENOUS ONCE
Refills: 0 | Status: DISCONTINUED | OUTPATIENT
Start: 2023-03-11 | End: 2023-03-11

## 2023-03-11 RX ORDER — CEFPODOXIME PROXETIL 100 MG
1 TABLET ORAL
Qty: 20 | Refills: 0
Start: 2023-03-11 | End: 2023-03-20

## 2023-03-11 RX ORDER — CEFTRIAXONE 500 MG/1
1000 INJECTION, POWDER, FOR SOLUTION INTRAMUSCULAR; INTRAVENOUS ONCE
Refills: 0 | Status: COMPLETED | OUTPATIENT
Start: 2023-03-11 | End: 2023-03-11

## 2023-03-11 RX ORDER — METHOCARBAMOL 500 MG/1
750 TABLET, FILM COATED ORAL ONCE
Refills: 0 | Status: COMPLETED | OUTPATIENT
Start: 2023-03-11 | End: 2023-03-11

## 2023-03-11 RX ADMIN — CEFTRIAXONE 1000 MILLIGRAM(S): 500 INJECTION, POWDER, FOR SOLUTION INTRAMUSCULAR; INTRAVENOUS at 13:41

## 2023-03-11 RX ADMIN — METHOCARBAMOL 750 MILLIGRAM(S): 500 TABLET, FILM COATED ORAL at 13:42

## 2023-03-11 RX ADMIN — MORPHINE SULFATE 4 MILLIGRAM(S): 50 CAPSULE, EXTENDED RELEASE ORAL at 13:42

## 2023-03-11 RX ADMIN — Medication 400 MILLIGRAM(S): at 11:43

## 2023-03-11 NOTE — ED STATDOCS - OBJECTIVE STATEMENT
66 y/o female w/ a PMHx of chronic ileostomy (since 1982), COPD, pre-diabetes, breast CA, and Crohn's presents to the ED c/o RLQ pain x2 days. Pt states pain is worse w/ movement and feels similar to her Crohn's pain but she hasn't had it in a while. Denies fever, n/v/d, urinary Sx, or appetite changes. No meds taken PTA. Pt notes she recently had an abnormal blood test including elevated triglycerides and elevated white cells in her urine. No other complaints at this time. Allergies: IV contrast and Phenergan. PCP: Dr. Austin.

## 2023-03-11 NOTE — ED STATDOCS - CARE PROVIDERS DIRECT ADDRESSES
[email or web address]

## 2023-03-11 NOTE — ED STATDOCS - NSFOLLOWUPINSTRUCTIONS_ED_ALL_ED_FT
Urinary Tract Infection    A urinary tract infection (UTI) is an infection of any part of the urinary tract, which includes the kidneys, ureters, bladder, and urethra. Risk factors include ignoring your need to urinate, wiping back to front if female, being an uncircumcised male, and having diabetes or a weak immune system. Symptoms include frequent urination, pain or burning with urination, foul smelling urine, cloudy urine, pain in the lower abdomen, blood in the urine, and fever. If you were prescribed an antibiotic medicine, take it as told by your health care provider. Do not stop taking the antibiotic even if you start to feel better.    SEEK IMMEDIATE MEDICAL CARE IF YOU HAVE ANY OF THE FOLLOWING SYMPTOMS: severe back or abdominal pain, fever, inability to keep fluids or medicine down, dizziness/lightheadedness, or a change in mental status.    follow up culture results  fu with nephrologist and urologist

## 2023-03-11 NOTE — ED STATDOCS - GASTROINTESTINAL, MLM
No reproducible TTP, left lower ileostomy, no surrounding, edema, erythema, or tenderness around site. Bowel sounds present.

## 2023-03-11 NOTE — ED STATDOCS - PROGRESS NOTE DETAILS
pt aware of her ct /us and lab results, she has relief of symptoms in the ED with meds given. pt aware to take antibiotics as directed, increase fluids and hydration and fu with urologist and nephrologist. pt agrees with plan and well appearing on dc ambulating. -Cathie Duffy PA-C

## 2023-03-11 NOTE — ED STATDOCS - NSICDXPASTSURGICALHX_GEN_ALL_CORE_FT
PAST SURGICAL HISTORY:  H/O ileostomy     H/O mastectomy, right     History of bowel resection

## 2023-03-11 NOTE — ED ADULT NURSE NOTE - OBJECTIVE STATEMENT
Pt presents to ED c/o RLQ pain x2days, worse with movement or sitting down. Denies chest pain, shortness of breath, palpitations, diaphoresis, headaches, fevers, dizziness, nausea, vomiting or urinary symptoms at this time. IV established in left arm with a 20G, labs drawn and sent, call bell in reach, warm blanket provided, bed in lowest position, side rails up x2, MD evaluation in progress. Will continue to monitor.

## 2023-03-11 NOTE — ED STATDOCS - CARE PROVIDER_API CALL
Andres Henson)  Urology  [facility]  [address], [address] [place] [address]  Phone: [phone]  Fax: [phone]  Follow Up Time: Urgent    Xavi Ngo)  Internal Medicine; Nephrology  [address] [place] [address]  Phone: [phone]  Fax: [phone]  Follow Up Time: Urgent

## 2023-03-11 NOTE — ED STATDOCS - NSICDXPASTMEDICALHX_GEN_ALL_CORE_FT
PAST MEDICAL HISTORY:  Breast cancer     COPD without exacerbation     Crohn disease     Gastritis     H/o Lyme disease PATIENT STATED THAT SHE WAS DIAGNOSED IN SEPT 2014      Osteopenia     S/P radiation therapy Hx of radiation therapy 1991  rt breast

## 2023-03-11 NOTE — ED STATDOCS - PATIENT PORTAL LINK FT
You can access the FollowMyHealth Patient Portal offered by [facility] by registering at the following website: [email or web address]. By joining [employer]’s FollowMyHealth portal, you will also be able to view your health information using other applications (apps) compatible with our system.

## 2023-03-11 NOTE — ED STATDOCS - CLINICAL SUMMARY MEDICAL DECISION MAKING FREE TEXT BOX
Mild SHEA, hypokalemia.  UA dirty, possible UTI.  CT and US unremarkable.  Okay for d/c home, will treat for possible UTI, pending cultures.  F/u with her doctor, and will also give urology follow up.  Return precautions given.

## 2023-03-11 NOTE — ED STATDOCS - PROVIDER TOKENS
PROVIDER:[TOKEN:[[id number]],FOLLOWUP:[Urgent]],PROVIDER:[TOKEN:[[id number]],FOLLOWUP:[Urgent]]

## 2023-03-12 LAB
CULTURE RESULTS: SIGNIFICANT CHANGE UP
SPECIMEN SOURCE: SIGNIFICANT CHANGE UP

## 2023-03-20 ENCOUNTER — APPOINTMENT (OUTPATIENT)
Dept: UROLOGY | Facility: CLINIC | Age: 68
End: 2023-03-20
Payer: MEDICARE

## 2023-03-20 VITALS
HEART RATE: 66 BPM | SYSTOLIC BLOOD PRESSURE: 130 MMHG | RESPIRATION RATE: 16 BRPM | OXYGEN SATURATION: 98 % | TEMPERATURE: 97.2 F | DIASTOLIC BLOOD PRESSURE: 79 MMHG

## 2023-03-20 DIAGNOSIS — R10.30 LOWER ABDOMINAL PAIN, UNSPECIFIED: ICD-10-CM

## 2023-03-20 DIAGNOSIS — R39.9 UNSPECIFIED SYMPTOMS AND SIGNS INVOLVING THE GENITOURINARY SYSTEM: ICD-10-CM

## 2023-03-20 PROCEDURE — 99204 OFFICE O/P NEW MOD 45 MIN: CPT

## 2023-03-20 NOTE — HISTORY OF PRESENT ILLNESS
[FreeTextEntry1] : 67F presents today with a CC of lower abdominal discomfort. PT was seen in the ER and evaluated for lower abdominal discomfort. She has an ileostomy which was transferred from the right to the left side many years ago. The only positive findings were pyuria and a renal stone. She denies a history of renal stones in the past.

## 2023-03-20 NOTE — REVIEW OF SYSTEMS
[Negative] : Heme/Lymph [Fever] : fever [Chills] : chills [Shortness Of Breath] : shortness of breath [Abdominal Pain] : abdominal pain [Diarrhea] : diarrhea [Painful [place]] : painful [place] [Genital bacterial infection] : genital bacterial infection [Genital yeast infection] : genital yeast infection [Urine Infection (bladder/kidney)] : bladder/kidney infection [Wake up at night to urinate  How many times?  ___] : wakes up to urinate [unfilled] times during the night [Strong urge to urinate] : strong urge to urinate [Joint Pain] : joint pain [Hot Flashes] : hot flashes [see HPI] : see HPI

## 2023-03-20 NOTE — PHYSICAL EXAM
[General Appearance - Well Developed] : well developed [General Appearance - Well Nourished] : well nourished [Normal Appearance] : normal appearance [Well Groomed] : well groomed [General Appearance - In No Acute Distress] : no acute distress [Edema] : no peripheral edema [Respiration, Rhythm And Depth] : normal respiratory rhythm and effort [Exaggerated Use Of Accessory Muscles For Inspiration] : no accessory muscle use [Abdomen Soft] : soft [Abdomen Tenderness] : non-tender [Costovertebral Angle Tenderness] : no ~M costovertebral angle tenderness [FreeTextEntry1] : Left lower quadrant ileostomy.  [Urinary Bladder Findings] : the bladder was normal on palpation [Normal Station and Gait] : the gait and station were normal for the patient's age [] : no rash [No Focal Deficits] : no focal deficits [Oriented To Time, Place, And Person] : oriented to person, place, and time [Affect] : the affect was normal [Mood] : the mood was normal [Not Anxious] : not anxious [No Palpable Adenopathy] : no palpable adenopathy

## 2023-03-21 LAB
APPEARANCE: CLEAR
BACTERIA: NEGATIVE
BILIRUBIN URINE: NEGATIVE
BLOOD URINE: NEGATIVE
COLOR: NORMAL
GLUCOSE QUALITATIVE U: NEGATIVE
HYALINE CASTS: 3 /LPF
KETONES URINE: NEGATIVE
LEUKOCYTE ESTERASE URINE: NEGATIVE
MICROSCOPIC-UA: NORMAL
NITRITE URINE: NEGATIVE
PH URINE: 6
PROTEIN URINE: ABNORMAL
RED BLOOD CELLS URINE: 1 /HPF
SPECIFIC GRAVITY URINE: 1.02
SQUAMOUS EPITHELIAL CELLS: 2 /HPF
UROBILINOGEN URINE: NORMAL
WHITE BLOOD CELLS URINE: 3 /HPF

## 2023-03-22 ENCOUNTER — APPOINTMENT (OUTPATIENT)
Dept: RADIOLOGY | Facility: CLINIC | Age: 68
End: 2023-03-22
Payer: MEDICARE

## 2023-03-22 ENCOUNTER — OUTPATIENT (OUTPATIENT)
Dept: OUTPATIENT SERVICES | Facility: HOSPITAL | Age: 68
LOS: 1 days | End: 2023-03-22
Payer: MEDICARE

## 2023-03-22 DIAGNOSIS — R10.30 LOWER ABDOMINAL PAIN, UNSPECIFIED: ICD-10-CM

## 2023-03-22 DIAGNOSIS — Z90.11 ACQUIRED ABSENCE OF RIGHT BREAST AND NIPPLE: Chronic | ICD-10-CM

## 2023-03-22 DIAGNOSIS — Z98.890 OTHER SPECIFIED POSTPROCEDURAL STATES: Chronic | ICD-10-CM

## 2023-03-22 DIAGNOSIS — Z90.49 ACQUIRED ABSENCE OF OTHER SPECIFIED PARTS OF DIGESTIVE TRACT: Chronic | ICD-10-CM

## 2023-03-22 PROCEDURE — 74018 RADEX ABDOMEN 1 VIEW: CPT

## 2023-03-22 PROCEDURE — 74018 RADEX ABDOMEN 1 VIEW: CPT | Mod: 26

## 2023-03-23 LAB — URINE CYTOLOGY: NORMAL

## 2023-03-24 DIAGNOSIS — N89.8 OTHER SPECIFIED NONINFLAMMATORY DISORDERS OF VAGINA: ICD-10-CM

## 2023-03-24 LAB — BACTERIA UR CULT: ABNORMAL

## 2023-03-24 RX ORDER — FLUCONAZOLE 150 MG/1
150 TABLET ORAL DAILY
Qty: 1 | Refills: 0 | Status: ACTIVE | COMMUNITY
Start: 2023-03-24 | End: 1900-01-01

## 2023-03-24 RX ORDER — METRONIDAZOLE 500 MG/1
500 TABLET ORAL
Qty: 3 | Refills: 0 | Status: ACTIVE | COMMUNITY
Start: 2023-03-24 | End: 1900-01-01

## 2023-03-24 RX ORDER — NITROFURANTOIN (MONOHYDRATE/MACROCRYSTALS) 25; 75 MG/1; MG/1
100 CAPSULE ORAL TWICE DAILY
Qty: 14 | Refills: 0 | Status: ACTIVE | COMMUNITY
Start: 2023-03-24 | End: 1900-01-01

## 2023-04-21 ENCOUNTER — APPOINTMENT (OUTPATIENT)
Dept: UROLOGY | Facility: CLINIC | Age: 68
End: 2023-04-21
Payer: MEDICARE

## 2023-04-21 VITALS
DIASTOLIC BLOOD PRESSURE: 78 MMHG | HEART RATE: 70 BPM | TEMPERATURE: 97.6 F | HEIGHT: 62 IN | OXYGEN SATURATION: 96 % | WEIGHT: 129 LBS | RESPIRATION RATE: 14 BRPM | BODY MASS INDEX: 23.74 KG/M2 | SYSTOLIC BLOOD PRESSURE: 130 MMHG

## 2023-04-21 PROCEDURE — 99213 OFFICE O/P EST LOW 20 MIN: CPT | Mod: 25

## 2023-04-21 PROCEDURE — 52285 CYSTOSCOPY AND TREATMENT: CPT

## 2023-04-21 RX ORDER — POTASSIUM CITRATE, TRISODIUM CITRATE DIHYDRATE AND CITRIC ACID MONOHYDRATE 550; 500; 334 MG/5ML; MG/5ML; MG/5ML
550-500-334 SOLUTION ORAL
Qty: 840 | Refills: 0 | Status: ACTIVE | COMMUNITY
Start: 2023-04-21 | End: 1900-01-01

## 2023-04-21 NOTE — HISTORY OF PRESENT ILLNESS
[FreeTextEntry1] : 67F presents today with a CC of a urinary stone. Pt is here today for cystourethroscopy and urethral dilation. She had a 3mm stone noted on a recent CT scan. This was not noted on a recent [facility] x-ray.

## 2023-04-21 NOTE — PHYSICAL EXAM

## 2023-04-21 NOTE — END OF VISIT
[FreeTextEntry3] : A discussion was had and the patient will trial an alkalization trial to see if the stone can be ablated that way. A renal US will be checked in 3 weeks to see if we were successful or not. Her urethra was dilated as described.

## 2023-04-26 ENCOUNTER — APPOINTMENT (OUTPATIENT)
Dept: NEPHROLOGY | Facility: CLINIC | Age: 68
End: 2023-04-26

## 2023-05-07 ENCOUNTER — INPATIENT (INPATIENT)
Facility: HOSPITAL | Age: 68
LOS: 2 days | Discharge: HOME | DRG: 872 | End: 2023-05-10
Attending: INTERNAL MEDICINE | Admitting: FAMILY MEDICINE
Payer: MEDICARE

## 2023-05-07 ENCOUNTER — TRANSCRIPTION ENCOUNTER (OUTPATIENT)
Age: 68
End: 2023-05-07

## 2023-05-07 VITALS — HEIGHT: 62 IN | WEIGHT: 128.97 LBS

## 2023-05-07 DIAGNOSIS — Z90.11 ACQUIRED ABSENCE OF RIGHT BREAST AND NIPPLE: Chronic | ICD-10-CM

## 2023-05-07 DIAGNOSIS — N39.0 URINARY TRACT INFECTION, SITE NOT SPECIFIED: ICD-10-CM

## 2023-05-07 DIAGNOSIS — Z90.49 ACQUIRED ABSENCE OF OTHER SPECIFIED PARTS OF DIGESTIVE TRACT: Chronic | ICD-10-CM

## 2023-05-07 DIAGNOSIS — Z98.890 OTHER SPECIFIED POSTPROCEDURAL STATES: Chronic | ICD-10-CM

## 2023-05-07 LAB
ALBUMIN SERPL ELPH-MCNC: 3.3 G/DL — SIGNIFICANT CHANGE UP (ref 3.3–5)
ALP SERPL-CCNC: 97 U/L — SIGNIFICANT CHANGE UP (ref 40–120)
ALT FLD-CCNC: 43 U/L — SIGNIFICANT CHANGE UP (ref 12–78)
ANION GAP SERPL CALC-SCNC: 6 MMOL/L — SIGNIFICANT CHANGE UP (ref 5–17)
APPEARANCE UR: ABNORMAL
APTT BLD: 29.3 SEC — SIGNIFICANT CHANGE UP (ref 27.5–35.5)
AST SERPL-CCNC: 43 U/L — HIGH (ref 15–37)
BACTERIA # UR AUTO: ABNORMAL
BASOPHILS # BLD AUTO: 0.03 K/UL — SIGNIFICANT CHANGE UP (ref 0–0.2)
BASOPHILS NFR BLD AUTO: 0.3 % — SIGNIFICANT CHANGE UP (ref 0–2)
BILIRUB SERPL-MCNC: 0.9 MG/DL — SIGNIFICANT CHANGE UP (ref 0.2–1.2)
BILIRUB UR-MCNC: NEGATIVE — SIGNIFICANT CHANGE UP
BUN SERPL-MCNC: 15 MG/DL — SIGNIFICANT CHANGE UP (ref 7–23)
CALCIUM SERPL-MCNC: 9.2 MG/DL — SIGNIFICANT CHANGE UP (ref 8.5–10.1)
CHLORIDE SERPL-SCNC: 103 MMOL/L — SIGNIFICANT CHANGE UP (ref 96–108)
CO2 SERPL-SCNC: 25 MMOL/L — SIGNIFICANT CHANGE UP (ref 22–31)
COLOR SPEC: YELLOW — SIGNIFICANT CHANGE UP
COMMENT - URINE: SIGNIFICANT CHANGE UP
CREAT SERPL-MCNC: 1.44 MG/DL — HIGH (ref 0.5–1.3)
DIFF PNL FLD: ABNORMAL
EGFR: 40 ML/MIN/1.73M2 — LOW
EOSINOPHIL # BLD AUTO: 0 K/UL — SIGNIFICANT CHANGE UP (ref 0–0.5)
EOSINOPHIL NFR BLD AUTO: 0 % — SIGNIFICANT CHANGE UP (ref 0–6)
EPI CELLS # UR: SIGNIFICANT CHANGE UP
GLUCOSE SERPL-MCNC: 120 MG/DL — HIGH (ref 70–99)
GLUCOSE UR QL: NEGATIVE — SIGNIFICANT CHANGE UP
HCT VFR BLD CALC: 39.2 % — SIGNIFICANT CHANGE UP (ref 34.5–45)
HGB BLD-MCNC: 12.6 G/DL — SIGNIFICANT CHANGE UP (ref 11.5–15.5)
IMM GRANULOCYTES NFR BLD AUTO: 0.6 % — SIGNIFICANT CHANGE UP (ref 0–0.9)
INR BLD: 1.06 RATIO — SIGNIFICANT CHANGE UP (ref 0.88–1.16)
KETONES UR-MCNC: NEGATIVE — SIGNIFICANT CHANGE UP
LACTATE SERPL-SCNC: 1.3 MMOL/L — SIGNIFICANT CHANGE UP (ref 0.7–2)
LEUKOCYTE ESTERASE UR-ACNC: ABNORMAL
LYMPHOCYTES # BLD AUTO: 0.69 K/UL — LOW (ref 1–3.3)
LYMPHOCYTES # BLD AUTO: 7.1 % — LOW (ref 13–44)
MCHC RBC-ENTMCNC: 30.1 PG — SIGNIFICANT CHANGE UP (ref 27–34)
MCHC RBC-ENTMCNC: 32.1 GM/DL — SIGNIFICANT CHANGE UP (ref 32–36)
MCV RBC AUTO: 93.6 FL — SIGNIFICANT CHANGE UP (ref 80–100)
MONOCYTES # BLD AUTO: 0.55 K/UL — SIGNIFICANT CHANGE UP (ref 0–0.9)
MONOCYTES NFR BLD AUTO: 5.7 % — SIGNIFICANT CHANGE UP (ref 2–14)
NEUTROPHILS # BLD AUTO: 8.33 K/UL — HIGH (ref 1.8–7.4)
NEUTROPHILS NFR BLD AUTO: 86.3 % — HIGH (ref 43–77)
NITRITE UR-MCNC: NEGATIVE — SIGNIFICANT CHANGE UP
PH UR: 6 — SIGNIFICANT CHANGE UP (ref 5–8)
PLATELET # BLD AUTO: 236 K/UL — SIGNIFICANT CHANGE UP (ref 150–400)
POTASSIUM SERPL-MCNC: 3.8 MMOL/L — SIGNIFICANT CHANGE UP (ref 3.5–5.3)
POTASSIUM SERPL-SCNC: 3.8 MMOL/L — SIGNIFICANT CHANGE UP (ref 3.5–5.3)
PROT SERPL-MCNC: 8.1 GM/DL — SIGNIFICANT CHANGE UP (ref 6–8.3)
PROT UR-MCNC: 100
PROTHROM AB SERPL-ACNC: 12.3 SEC — SIGNIFICANT CHANGE UP (ref 10.5–13.4)
RAPID RVP RESULT: SIGNIFICANT CHANGE UP
RBC # BLD: 4.19 M/UL — SIGNIFICANT CHANGE UP (ref 3.8–5.2)
RBC # FLD: 14 % — SIGNIFICANT CHANGE UP (ref 10.3–14.5)
RBC CASTS # UR COMP ASSIST: ABNORMAL /HPF (ref 0–4)
SARS-COV-2 RNA SPEC QL NAA+PROBE: SIGNIFICANT CHANGE UP
SODIUM SERPL-SCNC: 134 MMOL/L — LOW (ref 135–145)
SP GR SPEC: 1.01 — SIGNIFICANT CHANGE UP (ref 1.01–1.02)
UROBILINOGEN FLD QL: NEGATIVE — SIGNIFICANT CHANGE UP
WBC # BLD: 9.66 K/UL — SIGNIFICANT CHANGE UP (ref 3.8–10.5)
WBC # FLD AUTO: 9.66 K/UL — SIGNIFICANT CHANGE UP (ref 3.8–10.5)
WBC UR QL: ABNORMAL /HPF (ref 0–5)

## 2023-05-07 PROCEDURE — 99223 1ST HOSP IP/OBS HIGH 75: CPT

## 2023-05-07 PROCEDURE — 74176 CT ABD & PELVIS W/O CONTRAST: CPT | Mod: 26

## 2023-05-07 PROCEDURE — 85027 COMPLETE CBC AUTOMATED: CPT

## 2023-05-07 PROCEDURE — 80048 BASIC METABOLIC PNL TOTAL CA: CPT

## 2023-05-07 PROCEDURE — 93010 ELECTROCARDIOGRAM REPORT: CPT

## 2023-05-07 PROCEDURE — 36415 COLL VENOUS BLD VENIPUNCTURE: CPT

## 2023-05-07 PROCEDURE — 93005 ELECTROCARDIOGRAM TRACING: CPT

## 2023-05-07 PROCEDURE — 99285 EMERGENCY DEPT VISIT HI MDM: CPT

## 2023-05-07 PROCEDURE — 71045 X-RAY EXAM CHEST 1 VIEW: CPT | Mod: 26

## 2023-05-07 PROCEDURE — 74176 CT ABD & PELVIS W/O CONTRAST: CPT

## 2023-05-07 PROCEDURE — 83735 ASSAY OF MAGNESIUM: CPT

## 2023-05-07 PROCEDURE — 84100 ASSAY OF PHOSPHORUS: CPT

## 2023-05-07 RX ORDER — IBUPROFEN 200 MG
600 TABLET ORAL EVERY 6 HOURS
Refills: 0 | Status: DISCONTINUED | OUTPATIENT
Start: 2023-05-07 | End: 2023-05-07

## 2023-05-07 RX ORDER — CEFTRIAXONE 500 MG/1
1000 INJECTION, POWDER, FOR SOLUTION INTRAMUSCULAR; INTRAVENOUS ONCE
Refills: 0 | Status: COMPLETED | OUTPATIENT
Start: 2023-05-07 | End: 2023-05-07

## 2023-05-07 RX ORDER — POTASSIUM CITRATE AND CITRIC ACID MONOHYDRATE 1100; 334 MG/5ML; MG/5ML
10 SOLUTION ORAL
Refills: 0 | DISCHARGE

## 2023-05-07 RX ORDER — TAMOXIFEN CITRATE 20 MG/1
1 TABLET, FILM COATED ORAL
Qty: 0 | Refills: 0 | DISCHARGE

## 2023-05-07 RX ORDER — CEFTRIAXONE 500 MG/1
1000 INJECTION, POWDER, FOR SOLUTION INTRAMUSCULAR; INTRAVENOUS EVERY 24 HOURS
Refills: 0 | Status: DISCONTINUED | OUTPATIENT
Start: 2023-05-08 | End: 2023-05-08

## 2023-05-07 RX ORDER — LANOLIN ALCOHOL/MO/W.PET/CERES
3 CREAM (GRAM) TOPICAL AT BEDTIME
Refills: 0 | Status: DISCONTINUED | OUTPATIENT
Start: 2023-05-07 | End: 2023-05-10

## 2023-05-07 RX ORDER — METOCLOPRAMIDE HCL 10 MG
10 TABLET ORAL ONCE
Refills: 0 | Status: COMPLETED | OUTPATIENT
Start: 2023-05-07 | End: 2023-05-07

## 2023-05-07 RX ORDER — SODIUM CHLORIDE 9 MG/ML
1000 INJECTION INTRAMUSCULAR; INTRAVENOUS; SUBCUTANEOUS
Refills: 0 | Status: COMPLETED | OUTPATIENT
Start: 2023-05-07 | End: 2023-05-08

## 2023-05-07 RX ORDER — ACETAMINOPHEN 500 MG
650 TABLET ORAL EVERY 6 HOURS
Refills: 0 | Status: DISCONTINUED | OUTPATIENT
Start: 2023-05-07 | End: 2023-05-09

## 2023-05-07 RX ORDER — ENOXAPARIN SODIUM 100 MG/ML
30 INJECTION SUBCUTANEOUS EVERY 24 HOURS
Refills: 0 | Status: DISCONTINUED | OUTPATIENT
Start: 2023-05-07 | End: 2023-05-10

## 2023-05-07 RX ORDER — ACETAMINOPHEN 500 MG
975 TABLET ORAL ONCE
Refills: 0 | Status: COMPLETED | OUTPATIENT
Start: 2023-05-07 | End: 2023-05-07

## 2023-05-07 RX ORDER — UMECLIDINIUM BROMIDE AND VILANTEROL TRIFENATATE 62.5; 25 UG/1; UG/1
1 POWDER RESPIRATORY (INHALATION)
Qty: 0 | Refills: 0 | DISCHARGE

## 2023-05-07 RX ORDER — ATORVASTATIN CALCIUM 80 MG/1
10 TABLET, FILM COATED ORAL AT BEDTIME
Refills: 0 | Status: DISCONTINUED | OUTPATIENT
Start: 2023-05-07 | End: 2023-05-10

## 2023-05-07 RX ORDER — ATORVASTATIN CALCIUM 80 MG/1
1 TABLET, FILM COATED ORAL
Refills: 0 | DISCHARGE

## 2023-05-07 RX ORDER — CHOLECALCIFEROL (VITAMIN D3) 125 MCG
1000 CAPSULE ORAL DAILY
Refills: 0 | Status: DISCONTINUED | OUTPATIENT
Start: 2023-05-07 | End: 2023-05-10

## 2023-05-07 RX ORDER — CEFTRIAXONE 500 MG/1
1000 INJECTION, POWDER, FOR SOLUTION INTRAMUSCULAR; INTRAVENOUS ONCE
Refills: 0 | Status: DISCONTINUED | OUTPATIENT
Start: 2023-05-07 | End: 2023-05-07

## 2023-05-07 RX ORDER — CHOLECALCIFEROL (VITAMIN D3) 125 MCG
1 CAPSULE ORAL
Qty: 0 | Refills: 0 | DISCHARGE

## 2023-05-07 RX ORDER — SODIUM CHLORIDE 9 MG/ML
1800 INJECTION INTRAMUSCULAR; INTRAVENOUS; SUBCUTANEOUS ONCE
Refills: 0 | Status: COMPLETED | OUTPATIENT
Start: 2023-05-07 | End: 2023-05-07

## 2023-05-07 RX ORDER — ONDANSETRON 8 MG/1
4 TABLET, FILM COATED ORAL EVERY 6 HOURS
Refills: 0 | Status: DISCONTINUED | OUTPATIENT
Start: 2023-05-07 | End: 2023-05-10

## 2023-05-07 RX ADMIN — Medication 600 MILLIGRAM(S): at 10:56

## 2023-05-07 RX ADMIN — Medication 650 MILLIGRAM(S): at 15:32

## 2023-05-07 RX ADMIN — ATORVASTATIN CALCIUM 10 MILLIGRAM(S): 80 TABLET, FILM COATED ORAL at 21:21

## 2023-05-07 RX ADMIN — SODIUM CHLORIDE 100 MILLILITER(S): 9 INJECTION INTRAMUSCULAR; INTRAVENOUS; SUBCUTANEOUS at 21:58

## 2023-05-07 RX ADMIN — Medication 650 MILLIGRAM(S): at 21:11

## 2023-05-07 RX ADMIN — CEFTRIAXONE 1000 MILLIGRAM(S): 500 INJECTION, POWDER, FOR SOLUTION INTRAMUSCULAR; INTRAVENOUS at 10:56

## 2023-05-07 RX ADMIN — SODIUM CHLORIDE 1800 MILLILITER(S): 9 INJECTION INTRAMUSCULAR; INTRAVENOUS; SUBCUTANEOUS at 09:53

## 2023-05-07 RX ADMIN — Medication 650 MILLIGRAM(S): at 21:40

## 2023-05-07 RX ADMIN — Medication 10 MILLIGRAM(S): at 13:39

## 2023-05-07 RX ADMIN — Medication 975 MILLIGRAM(S): at 09:53

## 2023-05-07 RX ADMIN — ENOXAPARIN SODIUM 30 MILLIGRAM(S): 100 INJECTION SUBCUTANEOUS at 21:56

## 2023-05-07 RX ADMIN — ONDANSETRON 4 MILLIGRAM(S): 8 TABLET, FILM COATED ORAL at 13:40

## 2023-05-07 NOTE — ED ADULT TRIAGE NOTE - CHIEF COMPLAINT QUOTE
fever x4 days, t max 104.6 associated with headache and generalized aches and pains. denies sick contacts. pt tested herself for COVID last night with at home test and tested negative. pt appreciates no additional complaints. pt was recently seen at  and discharged for nephrolithiases, reports she feels like she passed the stone but did not visualize stone itself.

## 2023-05-07 NOTE — ED ADULT NURSE NOTE - OBJECTIVE STATEMENT
Pt c/o fever x 4 days tmax 104, with headaches, generalized aches and pains. pt recently seen at  for nephrolithiases, pt states " I think I passed the stone but didn't see it.". denies sick contact. denies cp/sob/n/v/d. PMH kidney stone, Crohn's, CA breast x3 , mastectomy, colon resection.    fever x4 days, t max 104.6 associated with headache and generalized aches and pains. denies sick contacts. pt tested herself for COVID last night with at home test and tested negative. pt appreciates no additional complaints. pt was recently seen at  and discharged for nephrolithiases, reports she feels like she passed the stone but did not visualize stone itself.

## 2023-05-07 NOTE — H&P ADULT - NSHPPHYSICALEXAM_GEN_ALL_CORE
Vital Signs Last 24 Hrs  T(C): 38 (07 May 2023 09:21), Max: 38 (07 May 2023 09:21)  T(F): 100.4 (07 May 2023 09:21), Max: 100.4 (07 May 2023 09:21)  HR: 95 (07 May 2023 09:21) (95 - 95)  BP: 117/66 (07 May 2023 09:21) (117/66 - 117/66)  BP(mean): 82 (07 May 2023 09:21) (82 - 82)  RR: 19 (07 May 2023 09:21) (19 - 19)  SpO2: 95% (07 May 2023 09:21) (95% - 95%)    Parameters below as of 07 May 2023 09:21  Patient On (Oxygen Delivery Method): room air      PHYSICAL EXAM:    General: Well developed; well nourished; in no acute distress  Eyes: PERRLA, EOMI; conjunctiva and sclera clear  Head: Normocephalic; atraumatic  ENMT: No nasal discharge; airway clear  Neck: Supple; non tender; no masses  Respiratory: No wheezes, rales or rhonchi  Cardiovascular: Regular rate and rhythm. S1 and S2 Normal; No murmurs, gallops or rubs  Gastrointestinal: Soft non-tender non-distended; Normal bowel sounds  Genitourinary: No  suprapubic  tenderness  Extremities: Normal range of motion, No clubbing, cyanosis or edema  Vascular: Peripheral pulses palpable 2+ bilaterally  Neurological: Alert and oriented x4  Skin: Warm and dry. No acute rash  Lymph Nodes: No acute cervical adenopathy  Musculoskeletal: Normal muscle tone, without deformities  Psychiatric: Cooperative and appropriate Vital Signs Last 24 Hrs  T(C): 38 (07 May 2023 09:21), Max: 38 (07 May 2023 09:21)  T(F): 100.4 (07 May 2023 09:21), Max: 100.4 (07 May 2023 09:21)  HR: 95 (07 May 2023 09:21) (95 - 95)  BP: 117/66 (07 May 2023 09:21) (117/66 - 117/66)  BP(mean): 82 (07 May 2023 09:21) (82 - 82)  RR: 19 (07 May 2023 09:21) (19 - 19)  SpO2: 95% (07 May 2023 09:21) (95% - 95%)    Parameters below as of 07 May 2023 09:21  Patient On (Oxygen Delivery Method): room air      PHYSICAL EXAM:  General: Well developed; looks ill,  in no acute distress  Eyes: EOMI; conjunctiva and sclera clear  Head: Normocephalic; atraumatic  ENMT: No nasal discharge; airway clear  Neck: Supple; non tender; no masses  Respiratory: No wheezes, rales or rhonchi  Cardiovascular: Regular rate and rhythm. S1 and S2 Normal; No murmurs  Gastrointestinal: Soft non-tender non-distended; Normal bowel sounds  Genitourinary: No  suprapubic  tenderness  Extremities: No  edema  Neurological: Alert and oriented x3, non focal   Skin: Warm and dry. No acute rash  Musculoskeletal: Normal muscle tone, without deformities  Psychiatric: Cooperative and appropriate

## 2023-05-07 NOTE — ED ADULT NURSE NOTE - NSIMPLEMENTINTERV_GEN_ALL_ED
Date Performed: 05/08/2018       Time Performed: 07:01:18

 

PTAGE:      75 years

 

EKG:      Sinus rhythm 

 

 NORMAL ECG

 

PREVIOUS TRACING       : 08/18/1995 10.54

 

DOCTOR:   Vick Busch  Interpretating Date/Time  05/08/2018 10:47:15 Implemented All Universal Safety Interventions:  [place] to call system. Call bell, personal items and telephone within reach. Instruct patient to call for assistance. Room bathroom lighting operational. Non-slip footwear when patient is off stretcher. Physically safe environment: no spills, clutter or unnecessary equipment. Stretcher in lowest position, wheels locked, appropriate side rails in place.

## 2023-05-07 NOTE — ED PROVIDER NOTE - OBJECTIVE STATEMENT
68 yo F with PMHx of Crohn's disease, breast cancer s/p radiation, lyme disease, COVID, ileostomy, and osteopenia presents to ED c/o fever for 4 days, Tmax 104, with associating HA and dehydration Been taking ibuprofen every 6 hours and has been trying to stay hydrated. States she always feel dehydrated due to her ileostomy. Took 2 COVID test last night at home, negative. Was here 2-3 years ago with fever sx for a week, nothing was found. Had several other incidents in the past for her fever, nothing was found. Went to UC and PMD at the time, was told it was a virus. Reports last visit to ED on March 11 for RLQ pain, was discharged with nephrolithiases. Reports she feels like she passed the stone this week. Did not have breakfast PTA. Is on Lipitor and D3. Did not take anything PTA today.

## 2023-05-07 NOTE — ED PROVIDER NOTE - NSFOLLOWUPINSTRUCTIONS_ED_ALL_ED_FT
Follow-up with your regular physician  Return with any persistent/worsening symptoms.     Urinary Tract Infection    A urinary tract infection (UTI) is an infection of any part of the urinary tract, which includes the kidneys, ureters, bladder, and urethra. Risk factors include ignoring your need to urinate, wiping back to front if female, being an uncircumcised male, and having diabetes or a weak immune system. Symptoms include frequent urination, pain or burning with urination, foul smelling urine, cloudy urine, pain in the lower abdomen, blood in the urine, and fever. If you were prescribed an antibiotic medicine, take it as told by your health care provider. Do not stop taking the antibiotic even if you start to feel better.    SEEK IMMEDIATE MEDICAL CARE IF YOU HAVE ANY OF THE FOLLOWING SYMPTOMS: severe back or abdominal pain, fever, inability to keep fluids or medicine down, dizziness/lightheadedness, or a change in mental status.

## 2023-05-07 NOTE — ED PROVIDER NOTE - PROGRESS NOTE DETAILS
UA with 25-50 WBCs (likely source of infection). Remaining labs unremarkable.  Fluids infusing, ceftriaxone ordered. CXR pending.

## 2023-05-07 NOTE — H&P ADULT - HISTORY OF PRESENT ILLNESS
66 yo F with PMHx of Crohn's disease, breast cancer s/p radiation, lyme disease, COVID, ileostomy, and osteopenia presents to ED c/o fever for 4 days, Tmax 104, with associating HA and dehydration Been taking ibuprofen every 6 hours and has been trying to stay hydrated. States she always feel dehydrated due to her ileostomy. Took 2 COVID test last night at home, negative. Was here 2-3 years ago with fever sx for a week, nothing was found. Had several other incidents in the past for her fever, nothing was found. Went to UC and PMD at the time, was told it was a virus. Reports last visit to ED on March 11 for RLQ pain, was discharged with nephrolithiases. Reports she feels like she passed the stone this week 66 yo F with PMHx of Crohn's disease, s/p colostomy and ileostomy, breast cancer s/p radiation, chemo,  lyme disease presented  to ED c/o fever for 4 days, Tmax 104,  associated  with HA, feels dehydrated.  Pt reports  that had dysuria and pain, " i think mt stone was moving" , " i think I passed  the stone". Reports that had recent cystoscopy with Dr Guillory. Pt was  taking ibuprofen every 6 hours for pain and fevers.   Reports last visit to ED on March 11 for RLQ pain, was discharged with nephrolithiases. Reports she feels like she passed the stone this week    In ED: fever 100.4, BP borderline. UA+. BCX and UCX sent. s/p IVF bolus, IV Ceftriaxone

## 2023-05-07 NOTE — H&P ADULT - ASSESSMENT
66 yo F with PMHx of HLD,  Crohn's disease, s/p colostomy and ileostomy, breast cancer s/p radiation and  chemo,  lyme disease, Nephrolithiasis had recent cystoscopy admitted for:       1.  Febrile syndrome/Sepsis due to UTI. Nephrolithiasis   admit to med surge   Fever, Low BP, elevated BUN/CR + source   Lactate WNL  S/p IVF Bolus  C/w  maintenance IVF    Ceftriaxone IV   F/u BCX and UCX  Check CT abd /pelvis   ID and Uro eval         2. Elevated BUN/CR likely SHEA  possibly prerenal  and postrenal?   Check CT  abd/pelvis to r/o obstruction   IVF  Monitor UO   Avoid nephrotoxic meds   labs in am, if CR trending up consider renal eval       3. HLD   C/w Lipitor       4. DVT PPX:    68 yo F with PMHx of HLD,  Crohn's disease, s/p colostomy and ileostomy, breast cancer s/p radiation and  chemo,  lyme disease, Nephrolithiasis had recent cystoscopy admitted for:       1.  Febrile syndrome/Sepsis due to UTI. Nephrolithiasis   admit to med surge   Fever, Low BP, elevated BUN/CR + source   Lactate WNL  S/p IVF Bolus  C/w  maintenance IVF    Ceftriaxone IV   F/u BCX and UCX  Check CT abd /pelvis   ID and Uro eval         2. Elevated BUN/CR likely SHEA  possibly prerenal  and postrenal?   Check CT  abd/pelvis to r/o obstruction   IVF  Monitor UO   Avoid nephrotoxic meds   labs in am, if CR trending up consider renal eval       3. HLD   C/w Lipitor       4. DVT PPX: lovenox       5. ACP: FULL CODE

## 2023-05-07 NOTE — PATIENT PROFILE ADULT - CAREGIVER NAME
Phone Number Called: [phone] (home)       Call outcome: Spoke to patient regarding message below.    Message: Foster mom called and stated baby is doing better and his poop is thicker so she is wanting to have a RX sent to [facility] so they can get it. She also wanted to know if she still needs to keep the weight check appointment for 6/8/23?      Neal mom called back at 4:17 and stated that  informed her that patient is having very bad diarrhea again and she wants to know if he is still getting used to the formula or what to do.  
Jaxson Gomes

## 2023-05-07 NOTE — PATIENT PROFILE ADULT - FALL HARM RISK - UNIVERSAL INTERVENTIONS
Bed in lowest position, wheels locked, appropriate side rails in place/Call bell, personal items and telephone in reach/Instruct patient to call for assistance before getting out of bed or chair/Non-slip footwear when patient is out of bed/[place] to call system/Physically safe environment - no spills, clutter or unnecessary equipment/Purposeful Proactive Rounding/Room/bathroom lighting operational, light cord in reach

## 2023-05-07 NOTE — PATIENT PROFILE ADULT - FALL HARM RISK - TYPE OF ASSESSMENT
[FreeTextEntry1] : I, Vanessa Kohli, acted solely as a scribe for Dr. Dwight Hargrove on this date. 03/11/2021.  Admission

## 2023-05-08 LAB
ANION GAP SERPL CALC-SCNC: 7 MMOL/L — SIGNIFICANT CHANGE UP (ref 5–17)
BUN SERPL-MCNC: 17 MG/DL — SIGNIFICANT CHANGE UP (ref 7–23)
CALCIUM SERPL-MCNC: 8.1 MG/DL — LOW (ref 8.5–10.1)
CHLORIDE SERPL-SCNC: 113 MMOL/L — HIGH (ref 96–108)
CO2 SERPL-SCNC: 17 MMOL/L — LOW (ref 22–31)
CREAT SERPL-MCNC: 1.28 MG/DL — SIGNIFICANT CHANGE UP (ref 0.5–1.3)
CULTURE RESULTS: SIGNIFICANT CHANGE UP
EGFR: 46 ML/MIN/1.73M2 — LOW
GLUCOSE SERPL-MCNC: 128 MG/DL — HIGH (ref 70–99)
HCT VFR BLD CALC: 32.6 % — LOW (ref 34.5–45)
HGB BLD-MCNC: 10.2 G/DL — LOW (ref 11.5–15.5)
MCHC RBC-ENTMCNC: 30 PG — SIGNIFICANT CHANGE UP (ref 27–34)
MCHC RBC-ENTMCNC: 31.3 GM/DL — LOW (ref 32–36)
MCV RBC AUTO: 95.9 FL — SIGNIFICANT CHANGE UP (ref 80–100)
PLATELET # BLD AUTO: 203 K/UL — SIGNIFICANT CHANGE UP (ref 150–400)
POTASSIUM SERPL-MCNC: 3.5 MMOL/L — SIGNIFICANT CHANGE UP (ref 3.5–5.3)
POTASSIUM SERPL-SCNC: 3.5 MMOL/L — SIGNIFICANT CHANGE UP (ref 3.5–5.3)
RBC # BLD: 3.4 M/UL — LOW (ref 3.8–5.2)
RBC # FLD: 13.9 % — SIGNIFICANT CHANGE UP (ref 10.3–14.5)
SODIUM SERPL-SCNC: 137 MMOL/L — SIGNIFICANT CHANGE UP (ref 135–145)
SPECIMEN SOURCE: SIGNIFICANT CHANGE UP
WBC # BLD: 5.61 K/UL — SIGNIFICANT CHANGE UP (ref 3.8–10.5)
WBC # FLD AUTO: 5.61 K/UL — SIGNIFICANT CHANGE UP (ref 3.8–10.5)

## 2023-05-08 PROCEDURE — 99232 SBSQ HOSP IP/OBS MODERATE 35: CPT

## 2023-05-08 RX ORDER — SODIUM CHLORIDE 9 MG/ML
500 INJECTION INTRAMUSCULAR; INTRAVENOUS; SUBCUTANEOUS ONCE
Refills: 0 | Status: COMPLETED | OUTPATIENT
Start: 2023-05-08 | End: 2023-05-08

## 2023-05-08 RX ORDER — CEFTRIAXONE 500 MG/1
2000 INJECTION, POWDER, FOR SOLUTION INTRAMUSCULAR; INTRAVENOUS EVERY 24 HOURS
Refills: 0 | Status: DISCONTINUED | OUTPATIENT
Start: 2023-05-08 | End: 2023-05-10

## 2023-05-08 RX ORDER — ACETAMINOPHEN 500 MG
1000 TABLET ORAL ONCE
Refills: 0 | Status: COMPLETED | OUTPATIENT
Start: 2023-05-08 | End: 2023-05-08

## 2023-05-08 RX ADMIN — ATORVASTATIN CALCIUM 10 MILLIGRAM(S): 80 TABLET, FILM COATED ORAL at 22:59

## 2023-05-08 RX ADMIN — Medication 3 MILLIGRAM(S): at 23:05

## 2023-05-08 RX ADMIN — SODIUM CHLORIDE 500 MILLILITER(S): 9 INJECTION INTRAMUSCULAR; INTRAVENOUS; SUBCUTANEOUS at 07:47

## 2023-05-08 RX ADMIN — Medication 650 MILLIGRAM(S): at 15:19

## 2023-05-08 RX ADMIN — Medication 650 MILLIGRAM(S): at 10:15

## 2023-05-08 RX ADMIN — Medication 650 MILLIGRAM(S): at 04:08

## 2023-05-08 RX ADMIN — Medication 400 MILLIGRAM(S): at 20:46

## 2023-05-08 RX ADMIN — Medication 1000 UNIT(S): at 09:29

## 2023-05-08 RX ADMIN — SODIUM CHLORIDE 100 MILLILITER(S): 9 INJECTION INTRAMUSCULAR; INTRAVENOUS; SUBCUTANEOUS at 09:32

## 2023-05-08 RX ADMIN — Medication 650 MILLIGRAM(S): at 03:31

## 2023-05-08 RX ADMIN — CEFTRIAXONE 2000 MILLIGRAM(S): 500 INJECTION, POWDER, FOR SOLUTION INTRAMUSCULAR; INTRAVENOUS at 15:19

## 2023-05-08 RX ADMIN — SODIUM CHLORIDE 100 MILLILITER(S): 9 INJECTION INTRAMUSCULAR; INTRAVENOUS; SUBCUTANEOUS at 18:26

## 2023-05-08 RX ADMIN — Medication 650 MILLIGRAM(S): at 09:30

## 2023-05-08 RX ADMIN — Medication 1000 MILLIGRAM(S): at 21:16

## 2023-05-08 RX ADMIN — CEFTRIAXONE 1000 MILLIGRAM(S): 500 INJECTION, POWDER, FOR SOLUTION INTRAMUSCULAR; INTRAVENOUS at 09:32

## 2023-05-08 RX ADMIN — ENOXAPARIN SODIUM 30 MILLIGRAM(S): 100 INJECTION SUBCUTANEOUS at 22:59

## 2023-05-08 NOTE — CONSULT NOTE ADULT - ASSESSMENT
68 y/o Female with h/o Crohn's disease, s/p colostomy and ileostomy, breast cancer s/p radiation and chemo, prior Lyme disease, kidney stones was admitted on 5/7 for fever x 4 days, Tmax 104F,  associated with HA and increased weakness. She felt dehydrated. Pt reported dysuria and pain, "I think my stone was moving", " i think I passed  the stone". Reported a recent cystoscopy with Dr Guillory. Pt was  taking ibuprofen every 6 hours for pain and fevers. In ED: fever 100.4, BP borderline. She received IV Ceftriaxone.    1. Febrile syndrome. Probable sepsis. Pyuria. Probable UTI. Kidney stones. CRF stage 3.   -obtain BC x 2, urine c/s  -start ceftriaxone 1 gm IV qd  -reason for abx use and side effects reviewed with patient; monitor BMP   -urology evaluation  -old chart reviewed to assess prior cultures  -monitor temps  -f/u CBC  -supportive care  2. Other issues:   -care per medicine     66 y/o Female with h/o Crohn's disease, s/p colostomy and ileostomy, breast cancer s/p radiation and chemo, prior Lyme disease, kidney stones was admitted on 5/7 for fever x 4 days, Tmax 104F,  associated with HA and increased weakness. She felt dehydrated. Pt reported dysuria and pain, "I think my stone was moving", " i think I passed  the stone". Reported a recent cystoscopy with Dr uGillory. Pt was  taking ibuprofen every 6 hours for pain and fevers. In ED: fever 100.4, BP borderline. She received IV Ceftriaxone.    1. Febrile syndrome. Probable sepsis. Pyuria. Probable UTI. Left kidney stones. CRF stage 3. Crohn's disease. Immunocompromised host.   -obtain BC x 2, urine c/s  -start ceftriaxone 2 gm IV qd  -reason for abx use and side effects reviewed with patient; monitor BMP   -urology evaluation  -old chart reviewed to assess prior cultures  -monitor temps  -f/u CBC  -supportive care  2. Other issues:   -care per medicine

## 2023-05-08 NOTE — CONSULT NOTE ADULT - SUBJECTIVE AND OBJECTIVE BOX
Patient is a 67y old  Female who presents with a chief complaint of fever    HPI:  68 y/o Female with h/o Crohn's disease, s/p colostomy and ileostomy, breast cancer s/p radiation and chemo, prior Lyme disease, kidney stones was admitted on  for fever x 4 days, Tmax 104F,  associated with HA and increased weakness. She felt dehydrated. Pt reported dysuria and pain, "I think my stone was moving", " i think I passed  the stone". Reported a recent cystoscopy with Dr Guillory. Pt was  taking ibuprofen every 6 hours for pain and fevers. In ED: fever 100.4, BP borderline. She received IV Ceftriaxone.    PMH: as above  PSH: as above  Meds: per reconciliation sheet, noted below  MEDICATIONS  (STANDING):  atorvastatin 10 milliGRAM(s) Oral at bedtime  cefTRIAXone Injectable. 1000 milliGRAM(s) IV Push every 24 hours  cholecalciferol 1000 Unit(s) Oral daily  enoxaparin Injectable 30 milliGRAM(s) SubCutaneous every 24 hours  sodium chloride 0.9%. 1000 milliLiter(s) (100 mL/Hr) IV Continuous <Continuous>    MEDICATIONS  (PRN):  acetaminophen     Tablet .. 650 milliGRAM(s) Oral every 6 hours PRN Mild Pain (1 - 3)  aluminum hydroxide/magnesium hydroxide/simethicone Suspension 30 milliLiter(s) Oral every 4 hours PRN Dyspepsia  melatonin 3 milliGRAM(s) Oral at bedtime PRN Insomnia  ondansetron Injectable 4 milliGRAM(s) IV Push every 6 hours PRN Nausea and/or Vomiting    Allergies    Phenergan (Unknown)  IV Contrast (Unknown)    Intolerances      Social: no smoking, no alcohol, no illegal drugs; no recent travel, no exposure to TB  FAMILY HISTORY:  FH: heart disease  Family history of obesity  FHx: diabetes mellitus  Paternal family history of emphysema  Family hx of prostate cancer    ROS: the patient denies HA, no seizures, no dizziness, no sore throat, no nasal congestion, no blurry vision, no CP, no palpitations, no SOB, no cough, no abdominal pain, no diarrhea, no N/V, has dysuria, no leg pain, no claudication, no rash, no joint aches, no rectal pain or bleeding, no night sweats  All other systems reviewed and are negative    Vital Signs Last 24 Hrs  T(C): 37.1 (08 May 2023 08:56), Max: 39.1 (07 May 2023 22:15)  T(F): 98.7 (08 May 2023 08:56), Max: 102.3 (07 May 2023 22:15)  HR: 72 (08 May 2023 08:56) (64 - 90)  BP: 120/57 (08 May 2023 08:56) (91/69 - 140/58)  BP(mean): 72 (07 May 2023 17:30) (72 - 74)  RR: 18 (08 May 2023 08:56) (17 - 18)  SpO2: 96% (08 May 2023 08:56) (95% - 99%)    Parameters below as of 08 May 2023 08:56  Patient On (Oxygen Delivery Method): room air    PE:    Constitutional:  No acute distress  HEENT: NC/AT, EOMI, PERRLA, conjunctivae clear; ears and nose atraumatic; pharynx benign  Neck: supple; thyroid not palpable  Back: no tenderness  Respiratory: respiratory effort normal; clear to auscultation  Cardiovascular: S1S2 regular, no murmurs  Abdomen: soft, not tender, not distended, positive BS; no liver or spleen organomegaly  Genitourinary: no suprapubic tenderness  Lymphatic: no LN palpable  Musculoskeletal: no muscle tenderness, no joint swelling or tenderness  Extremities: no pedal edema  Neurological/ Psychiatric: AxOx3, judgement and insight normal; moving all extremities  Skin: no rashes; no palpable lesions    Labs: all available labs reviewed                        10.2   5.61  )-----------( 203      ( 08 May 2023 05:48 )             32.6     05-08    137  |  113<H>  |  17  ----------------------------<  128<H>  3.5   |  17<L>  |  1.28    Ca    8.1<L>      08 May 2023 05:48    TPro  8.1  /  Alb  3.3  /  TBili  0.9  /  DBili  x   /  AST  43<H>  /  ALT  43  /  AlkPhos  97  05-07     LIVER FUNCTIONS - ( 07 May 2023 09:45 )  Alb: 3.3 g/dL / Pro: 8.1 gm/dL / ALK PHOS: 97 U/L / ALT: 43 U/L / AST: 43 U/L / GGT: x           Urinalysis Basic - ( 07 May 2023 09:45 )    Color: Yellow / Appearance: very cloudy / S.015 / pH: x  Gluc: x / Ketone: Negative  / Bili: Negative / Urobili: Negative   Blood: x / Protein: 100 / Nitrite: Negative   Leuk Esterase: Moderate / RBC: 3-5 /HPF / WBC 26-50 /HPF   Sq Epi: x / Non Sq Epi: x / Bacteria: Moderate    ( @ 09:45)  NotDete        Radiology: all available radiological tests reviewed    < from: CT Abdomen and Pelvis No Cont (23 @ 16:10) >  IMPRESSION: 3 mm nonobstructing left renal calculus.    < end of copied text >      Advanced directives addressed: full resuscitation Patient is a 67y old  Female who presents with a chief complaint of fever    HPI:  66 y/o Female with h/o Crohn's disease, s/p colostomy and ileostomy, breast cancer s/p radiation and chemo, prior Lyme disease, kidney stones was admitted on  for fever x 4 days, Tmax 104F,  associated with HA and increased weakness. She felt dehydrated. Pt reported dysuria and pain, "I think my stone was moving", " i think I passed  the stone". Reported a recent cystoscopy with Dr Guillory. Pt was  taking ibuprofen every 6 hours for pain and fevers. In ED: fever 100.4, BP borderline. She received IV Ceftriaxone.    PMH: as above  PSH: as above  Meds: per reconciliation sheet, noted below  MEDICATIONS  (STANDING):  atorvastatin 10 milliGRAM(s) Oral at bedtime  cefTRIAXone Injectable. 1000 milliGRAM(s) IV Push every 24 hours  cholecalciferol 1000 Unit(s) Oral daily  enoxaparin Injectable 30 milliGRAM(s) SubCutaneous every 24 hours  sodium chloride 0.9%. 1000 milliLiter(s) (100 mL/Hr) IV Continuous <Continuous>    MEDICATIONS  (PRN):  acetaminophen     Tablet .. 650 milliGRAM(s) Oral every 6 hours PRN Mild Pain (1 - 3)  aluminum hydroxide/magnesium hydroxide/simethicone Suspension 30 milliLiter(s) Oral every 4 hours PRN Dyspepsia  melatonin 3 milliGRAM(s) Oral at bedtime PRN Insomnia  ondansetron Injectable 4 milliGRAM(s) IV Push every 6 hours PRN Nausea and/or Vomiting    Allergies    Phenergan (Unknown)  IV Contrast (Unknown)    Intolerances    Social: no smoking, no alcohol, no illegal drugs; no recent travel, no exposure to TB  FAMILY HISTORY:  FH: heart disease  Family history of obesity  FHx: diabetes mellitus  Paternal family history of emphysema  Family hx of prostate cancer    ROS: the patient denies HA, no seizures, no dizziness, no sore throat, no nasal congestion, no blurry vision, no CP, no palpitations, no SOB, no cough, no abdominal pain, no diarrhea, no N/V, has dysuria, no leg pain, no claudication, no rash, no joint aches, no rectal pain or bleeding, no night sweats  All other systems reviewed and are negative    Vital Signs Last 24 Hrs  T(C): 37.1 (08 May 2023 08:56), Max: 39.1 (07 May 2023 22:15)  T(F): 98.7 (08 May 2023 08:56), Max: 102.3 (07 May 2023 22:15)  HR: 72 (08 May 2023 08:56) (64 - 90)  BP: 120/57 (08 May 2023 08:56) (91/69 - 140/58)  BP(mean): 72 (07 May 2023 17:30) (72 - 74)  RR: 18 (08 May 2023 08:56) (17 - 18)  SpO2: 96% (08 May 2023 08:56) (95% - 99%)    Parameters below as of 08 May 2023 08:56  Patient On (Oxygen Delivery Method): room air    PE:    Constitutional:  No acute distress  HEENT: NC/AT, EOMI, PERRLA, conjunctivae clear; ears and nose atraumatic; pharynx benign  Neck: supple; thyroid not palpable  Back: no tenderness  Respiratory: respiratory effort normal; clear to auscultation  Cardiovascular: S1S2 regular, no murmurs  Abdomen: soft, not tender, not distended, positive BS; no liver or spleen organomegaly  Colostomy  Genitourinary: no suprapubic tenderness, no flank pain  Lymphatic: no LN palpable  Musculoskeletal: no muscle tenderness, no joint swelling or tenderness  Extremities: no pedal edema  Neurological/ Psychiatric: AxOx3, judgement and insight normal; moving all extremities  Skin: no rashes; no palpable lesions    Labs: all available labs reviewed                        10.2   5.61  )-----------( 203      ( 08 May 2023 05:48 )             32.6     05-08    137  |  113<H>  |  17  ----------------------------<  128<H>  3.5   |  17<L>  |  1.28    Ca    8.1<L>      08 May 2023 05:48    TPro  8.1  /  Alb  3.3  /  TBili  0.9  /  DBili  x   /  AST  43<H>  /  ALT  43  /  AlkPhos  97  05-07     LIVER FUNCTIONS - ( 07 May 2023 09:45 )  Alb: 3.3 g/dL / Pro: 8.1 gm/dL / ALK PHOS: 97 U/L / ALT: 43 U/L / AST: 43 U/L / GGT: x           Urinalysis Basic - ( 07 May 2023 09:45 )    Color: Yellow / Appearance: very cloudy / S.015 / pH: x  Gluc: x / Ketone: Negative  / Bili: Negative / Urobili: Negative   Blood: x / Protein: 100 / Nitrite: Negative   Leuk Esterase: Moderate / RBC: 3-5 /HPF / WBC 26-50 /HPF   Sq Epi: x / Non Sq Epi: x / Bacteria: Moderate    ( @ 09:45)  NotDete        Radiology: all available radiological tests reviewed    < from: CT Abdomen and Pelvis No Cont (23 @ 16:10) >  IMPRESSION: 3 mm nonobstructing left renal calculus.    < end of copied text >      Advanced directives addressed: full resuscitation

## 2023-05-08 NOTE — CONSULT NOTE ADULT - SUBJECTIVE AND OBJECTIVE BOX
HPI:  68 yo F with PMHx of Crohn's disease, s/p colostomy and ileostomy, breast cancer s/p radiation, chemo,  lyme disease presented  to ED c/o fever for 4 days, Tmax 104,  associated  with HA, feels dehydrated.  Pt reports  that had dysuria and pain, " i think mt stone was moving" , " i think I passed  the stone". Reports that had recent cystoscopy with Dr Guillory. Pt was  taking ibuprofen every 6 hours for pain and fevers.   Reports last visit to ED on  for RLQ pain, was discharged with nephrolithiases. Reports she feels like she passed the stone this week    In ED: fever 100.4, BP borderline. UA+. BCX and UCX sent. s/p IVF bolus, IV Ceftriaxone  (07 May 2023 12:15)    68 yo female with PMH as above admitted for fever/UTI. Pt known to Dr. Guillory reports she had a cystoscopy in the office last month. Reports she developed sudden onset of fevers, chills few days ago without any other symptoms. Denies any abd/flank pain, dysuria, hematuria.     PAST MEDICAL & SURGICAL HISTORY:  Breast cancer      COPD without exacerbation      Gastritis      H/o Lyme disease  PATIENT STATED THAT SHE WAS DIAGNOSED IN 2014        S/P radiation therapy  Hx of radiation therapy   rt breast        Osteopenia      Crohn disease      H/O mastectomy, right      History of bowel resection      H/O ileostomy          REVIEW OF SYSTEMS    All other review of systems neg, except as noted in HPI    MEDICATIONS  (STANDING):  atorvastatin 10 milliGRAM(s) Oral at bedtime  cefTRIAXone Injectable. 1000 milliGRAM(s) IV Push every 24 hours  cholecalciferol 1000 Unit(s) Oral daily  enoxaparin Injectable 30 milliGRAM(s) SubCutaneous every 24 hours  sodium chloride 0.9%. 1000 milliLiter(s) (100 mL/Hr) IV Continuous <Continuous>    MEDICATIONS  (PRN):  acetaminophen     Tablet .. 650 milliGRAM(s) Oral every 6 hours PRN Mild Pain (1 - 3)  aluminum hydroxide/magnesium hydroxide/simethicone Suspension 30 milliLiter(s) Oral every 4 hours PRN Dyspepsia  melatonin 3 milliGRAM(s) Oral at bedtime PRN Insomnia  ondansetron Injectable 4 milliGRAM(s) IV Push every 6 hours PRN Nausea and/or Vomiting      Allergies    Phenergan (Unknown)  IV Contrast (Unknown)    Intolerances        SOCIAL HISTORY:    FAMILY HISTORY:  FH: heart disease    Family history of obesity    FHx: diabetes mellitus    Paternal family history of emphysema    Family hx of prostate cancer        Vital Signs Last 24 Hrs  T(C): 37.1 (08 May 2023 08:56), Max: 39.1 (07 May 2023 22:15)  T(F): 98.7 (08 May 2023 08:56), Max: 102.3 (07 May 2023 22:15)  HR: 72 (08 May 2023 08:56) (64 - 90)  BP: 120/57 (08 May 2023 08:56) (91/69 - 140/58)  BP(mean): 72 (07 May 2023 17:30) (72 - 74)  RR: 18 (08 May 2023 08:56) (17 - 18)  SpO2: 96% (08 May 2023 08:56) (95% - 99%)    Parameters below as of 08 May 2023 08:56  Patient On (Oxygen Delivery Method): room air        PHYSICAL EXAM:      General: No distress, No anxiety  VITALS  T(C): 37.1 (23 @ 08:56), Max: 39.1 (23 @ 22:15)  HR: 72 (23 @ 08:56) (64 - 90)  BP: 120/57 (23 @ 08:56) (91/69 - 140/58)  RR: 18 (23 @ 08:56) (17 - 18)  SpO2: 96% (23 @ 08:56) (95% - 99%)            Skin     : No jaundice   HEENT: Normocephalic, no icterus , EOM full , No epistaxis  Lung    : No resp distress  Abdo:   : Soft, Non tender, No guarding, No distension   Back    : No CVAT b/l  Genitalia Female: No Fleming  Neuro   : A&Ox3             LABS:                        10.2   5.61  )-----------( 203      ( 08 May 2023 05:48 )             32.6     05-08    137  |  113<H>  |  17  ----------------------------<  128<H>  3.5   |  17<L>  |  1.28    Ca    8.1<L>      08 May 2023 05:48    TPro  8.1  /  Alb  3.3  /  TBili  0.9  /  DBili  x   /  AST  43<H>  /  ALT  43  /  AlkPhos  97  05-07    PT/INR - ( 07 May 2023 09:45 )   PT: 12.3 sec;   INR: 1.06 ratio         PTT - ( 07 May 2023 09:45 )  PTT:29.3 sec  Urinalysis Basic - ( 07 May 2023 09:45 )    Color: Yellow / Appearance: very cloudy / S.015 / pH: x  Gluc: x / Ketone: Negative  / Bili: Negative / Urobili: Negative   Blood: x / Protein: 100 / Nitrite: Negative   Leuk Esterase: Moderate / RBC: 3-5 /HPF / WBC 26-50 /HPF   Sq Epi: x / Non Sq Epi: x / Bacteria: Moderate        RADIOLOGY & ADDITIONAL STUDIES:  < from: CT Abdomen and Pelvis No Cont (23 @ 16:10) >    ACC: [id number] EXAM:  CT ABDOMEN AND PELVIS   ORDERED BY: PASQUALE CONNELL     PROCEDURE DATE:  2023          INTERPRETATION:  CLINICAL INFORMATION: hematuria JCT    COMPARISON: 3.11.23.    CONTRAST/COMPLICATIONS:  IV Contrast: NONE  Oral Contrast: NONE  Complications: None reported at time of study completion    PROCEDURE:  CT of the Abdomen and Pelvis was performed in the prone position.  Sagittal and coronal reformats were performed.    FINDINGS:    LOWER CHEST: A right mastectomy.    LIVER: Hepatic steatosis.  BILE DUCTS: Normal caliber.  GALLBLADDER: Cholelithiasis.  SPLEEN: Within normal limits.  PANCREAS: Within normal limits.  ADRENALS: Within normal limits.  KIDNEYS/URETERS: Left renal cysts. A 3 mm left upper pole calculus.    BLADDER: Within normal limits.  REPRODUCTIVE ORGANS: Within normal limits.    BOWEL: No bowel obstruction. Total colectomy with ileostomy.  PERITONEUM: No ascites.  VESSELS:  Within normal limits.  RETROPERITONEUM/LYMPH NODES: Multiple small nodes.  ABDOMINAL WALL: Within normal limits.  BONES: Within normal limits.    IMPRESSION: 3 mm nonobstructing left renal calculus.        --- End of Report ---            USMAN POTTS MD; Attending Radiologist  This document has been electronically signed. May  7 2023  4:24PM    < end of copied text >

## 2023-05-08 NOTE — PROGRESS NOTE ADULT - SUBJECTIVE AND OBJECTIVE BOX
CC: 68 yo F with PMHx of Crohn's disease, s/p colostomy and ileostomy, breast cancer s/p radiation, chemo,  lyme disease presented  to ED c/o fever for 4 days, Tmax 104,  associated  with HA, feels dehydrated.  Pt reports  that had dysuria and pain, " i think mt stone was moving" , " i think I passed  the stone". Reports that had recent cystoscopy with Dr Guillory. Pt was  taking ibuprofen every 6 hours for pain and fevers.   Reports last visit to ED on March 11 for RLQ pain, was discharged with nephrolithiases. Reports she feels like she passed the stone this week    Vital Signs Last 24 Hrs  T(C): 37.1 (08 May 2023 08:56), Max: 39.1 (07 May 2023 22:15)  T(F): 98.7 (08 May 2023 08:56), Max: 102.3 (07 May 2023 22:15)  HR: 72 (08 May 2023 08:56) (64 - 90)  BP: 120/57 (08 May 2023 08:56) (94/48 - 140/58)  BP(mean): 72 (07 May 2023 17:30) (72 - 72)  RR: 18 (08 May 2023 08:56) (17 - 18)  SpO2: 96% (08 May 2023 08:56) (95% - 99%)    Parameters below as of 08 May 2023 08:56  Patient On (Oxygen Delivery Method): room air        I&O's Summary    07 May 2023 07:01  -  08 May 2023 07:00  --------------------------------------------------------  IN: 400 mL / OUT: 0 mL / NET: 400 mL        CAPILLARY BLOOD GLUCOSE          PHYSICAL EXAM:    Constitutional: NAD, awake and alert, well-developed  HEENT: PERR, EOMI  Neck: Soft and supple,  No JVD  Respiratory: Breath sounds are clear bilaterally, No wheezing, rales or rhonchi  Cardiovascular: S1 and S2, regular rate and rhythm, no Murmurs  Gastrointestinal: Bowel Sounds present, soft, nontender, nondistended  Extremities: No peripheral edema  Vascular: 2+ peripheral pulses  Neurological: A/O x 3, no focal deficits  Musculoskeletal: 5/5 strength b/l upper and lower extremities  Skin: No rashes    MEDICATIONS:  MEDICATIONS  (STANDING):  atorvastatin 10 milliGRAM(s) Oral at bedtime  cefTRIAXone Injectable. 2000 milliGRAM(s) IV Push every 24 hours  cholecalciferol 1000 Unit(s) Oral daily  enoxaparin Injectable 30 milliGRAM(s) SubCutaneous every 24 hours  sodium chloride 0.9%. 1000 milliLiter(s) (100 mL/Hr) IV Continuous <Continuous>      LABS: All Labs Reviewed:                        10.2   5.61  )-----------( 203      ( 08 May 2023 05:48 )             32.6   137  |  113<H>  |  17  ----------------------------<  128<H>  3.5   |  17<L>  |  1.28  Ca    8.1<L>      08 May 2023 05:48        RADIOLOGY/EKG:  < from: CT Abdomen and Pelvis No Cont (05.07.23 @ 16:10) >  IMPRESSION: 3 mm nonobstructing left renal calculus.         CC: 66 yo F with PMHx of Crohn's disease, s/p colostomy and ileostomy, breast cancer s/p radiation, chemo,  lyme disease presented  to ED c/o fever for 4 days, Tmax 104,  associated  with HA, feels dehydrated.  Pt reports  that had dysuria and pain, " i think my stone was moving" , " i think I passed  the stone". Reports that had recent cystoscopy with Dr Guillory. Pt was  taking ibuprofen every 6 hours for pain and fevers.   Reports last visit to ED on March 11 for RLQ pain, was discharged with nephrolithiases. Reports she feels like she passed the stone this week.    5/8 -     Vital Signs Last 24 Hrs  T(F): 98.7 (08 May 2023 08:56), Max: 102.3 (07 May 2023 22:15)  HR: 72 (08 May 2023 08:56) (64 - 90)  BP: 120/57 (08 May 2023 08:56) (94/48 - 140/58)  RR: 18 (08 May 2023 08:56) (17 - 18)  SpO2: 96% (08 May 2023 08:56) (95% - 99%)  Constitutional: NAD, awake and alert  HEENT: PERR, EOMI  Neck: Soft and supple,  No JVD  Respiratory: Breath sounds are clear bilaterally, No wheezing, rales or rhonchi  Cardiovascular: S1 and S2, regular rate and rhythm, no Murmurs  Gastrointestinal: Bowel Sounds present, soft, nontender, nondistended  Extremities: No peripheral edema  Vascular: 2+ peripheral pulses  Neurological: A/O x 3, no focal deficits  Musculoskeletal: 5/5 strength b/l upper and lower extremities  Skin: No rashes    MEDICATIONS:  MEDICATIONS  (STANDING):  atorvastatin 10 milliGRAM(s) Oral at bedtime  cefTRIAXone Injectable. 2000 milliGRAM(s) IV Push every 24 hours  cholecalciferol 1000 Unit(s) Oral daily  enoxaparin Injectable 30 milliGRAM(s) SubCutaneous every 24 hours  sodium chloride 0.9%. 1000 milliLiter(s) (100 mL/Hr) IV Continuous <Continuous>      LABS: All Labs Reviewed:                      10.2   5.61  )-----------( 203      ( 08 May 2023 05:48 )             32.6   137  |  113<H>  |  17  ----------------------------<  128<H>  3.5   |  17<L>  |  1.28  Ca    8.1<L>      08 May 2023 05:48        RADIOLOGY/EKG:  < from: CT Abdomen and Pelvis No Cont (05.07.23 @ 16:10) >  IMPRESSION: 3 mm nonobstructing left renal calculus.         CC: 68 yo F with PMHx of Crohn's disease, s/p colostomy and ileostomy, breast cancer s/p radiation, chemo,  lyme disease presented  to ED c/o fever for 4 days, Tmax 104,  associated  with HA, feels dehydrated.  Pt reports  that had dysuria and pain, " i think my stone was moving" , " i think I passed  the stone". Reports that had recent cystoscopy with Dr Guillory. Pt was  taking ibuprofen every 6 hours for pain and fevers.   Reports last visit to ED on March 11 for RLQ pain, was discharged with nephrolithiases. Reports she feels like she passed the stone this week.    5/8 - no cp palps sob abdo pain flank pain or dysuria; has mild ha. has been OOB     Vital Signs Last 24 Hrs  T(F): 98.7 (08 May 2023 08:56), Max: 102.3 (07 May 2023 22:15)  HR: 72 (08 May 2023 08:56) (64 - 90)  BP: 120/57 (08 May 2023 08:56) (94/48 - 140/58)  RR: 18 (08 May 2023 08:56) (17 - 18)  SpO2: 96% (08 May 2023 08:56) (95% - 99%)  Constitutional: NAD, awake and alert  HEENT: PERR, EOMI  Neck: Soft and supple,  No JVD  Respiratory: Breath sounds are clear bilaterally, No wheezing, rales or rhonchi  Cardiovascular: S1 and S2, regular rate and rhythm, no Murmurs  Gastrointestinal: Bowel Sounds present, soft, nontender, nondistended, ileostomy with stool   Extremities: No peripheral edema  Vascular: 2+ peripheral pulses  Neurological: A/O x 3, no focal deficits  Musculoskeletal: 5/5 strength b/l upper and lower extremities  Skin: No rashes    MEDICATIONS:  MEDICATIONS  (STANDING):  atorvastatin 10 milliGRAM(s) Oral at bedtime  cefTRIAXone Injectable. 2000 milliGRAM(s) IV Push every 24 hours  cholecalciferol 1000 Unit(s) Oral daily  enoxaparin Injectable 30 milliGRAM(s) SubCutaneous every 24 hours  sodium chloride 0.9%. 1000 milliLiter(s) (100 mL/Hr) IV Continuous <Continuous>      LABS: All Labs Reviewed:                      10.2   5.61  )-----------( 203      ( 08 May 2023 05:48 )             32.6   137  |  113<H>  |  17  ----------------------------<  128<H>  3.5   |  17<L>  |  1.28  Ca    8.1<L>      08 May 2023 05:48        RADIOLOGY/EKG:  < from: CT Abdomen and Pelvis No Cont (05.07.23 @ 16:10) >  IMPRESSION: 3 mm nonobstructing left renal calculus.

## 2023-05-08 NOTE — CONSULT NOTE ADULT - ASSESSMENT
66 yo female with PMH as above admitted for fever/UTI. Pt known to Dr. Guillory reports she had a cystoscopy in the office last month.   CT without any obstructing stones at this time- no emergent acute uro surgical intervention  Recommend  - Continue antibiotics, adjust as per cultures/ sensitivities    - Follow up with Dr. Guillory for further management    Case discussed with Dr. Guillory

## 2023-05-08 NOTE — PROGRESS NOTE ADULT - ASSESSMENT
68 yo F with PMHx of HLD,  Crohn's disease, s/p colostomy and ileostomy, breast cancer s/p radiation and  chemo,  lyme disease, Nephrolithiasis had recent cystoscopy admitted for:       1.  Febrile syndrome/Sepsis due to UTI. Nephrolithiasis   admit to med surge   Fever, Low BP, elevated BUN/CR + source   Lactate WNL  S/p IVF Bolus  C/w  maintenance IVF    Ceftriaxone IV   F/u BCX and UCX  Check CT abd /pelvis   ID and Uro eval       2. Elevated BUN/CR likely SHEA  possibly prerenal  and postrenal?   Check CT  abd/pelvis to r/o obstruction   IVF  Monitor UO   Avoid nephrotoxic meds   labs in am, if CR trending up consider renal eval     3. HLD   C/w Lipitor     4. DVT PPX: lovenox     5. ACP: FULL CODE   68 yo F with PMHx of HLD,  Crohn's disease, s/p colostomy and ileostomy, breast cancer s/p radiation and  chemo,  lyme disease, Nephrolithiasis had recent cystoscopy admitted for:       1.  Febrile syndrome/Sepsis due to UTI. Nephrolithiasis   Fever, Low BP, elevated BUN/CR + source   Lactate WNL; s/p IVF Bolus, now on IVFs   Ceftriaxone IV   F/u BCX and UCX  Check CT abd /pelvis   ID and Uro eval       Elevated BUN/CR likely SHEA  possibly prerenal  and postrenal?   Check CT  abd/pelvis to r/o obstruction   IVF  Monitor UO   Avoid nephrotoxic meds   labs in am, if CR trending up consider renal eval     HLP  C/w Lipitor     VTE PX lovenox   FULL CODE   66 yo F with PMHx of HLD,  Crohn's disease, s/p colostomy and ileostomy, breast cancer s/p radiation and  chemo,  lyme disease, Nephrolithiasis had recent cystoscopy admitted for:       1.  Febrile syndrome/Sepsis due to UTI. Nephrolithiasis   Fever, Low BP, elevated BUN/CR + source   Lactate WNL; s/p IVF Bolus, now on IVFs   Ceftriaxone 2g IV daily   F/u BCX and UCX  appreciate ID and Uro recs       Elevated BUN/CR likely SHEA  possibly prerenal  and postrenal?   Check CT  abd/pelvis to r/o obstruction   IVF - can dc, encourage oral hydration   Monitor UO   Pt counseled to avoid nephrotoxic meds incl NSAIDS     HLP  C/w Lipitor     VTE PX lovenox   FULL CODE

## 2023-05-09 LAB
ANION GAP SERPL CALC-SCNC: 9 MMOL/L — SIGNIFICANT CHANGE UP (ref 5–17)
BUN SERPL-MCNC: 12 MG/DL — SIGNIFICANT CHANGE UP (ref 7–23)
CALCIUM SERPL-MCNC: 8.5 MG/DL — SIGNIFICANT CHANGE UP (ref 8.5–10.1)
CHLORIDE SERPL-SCNC: 113 MMOL/L — HIGH (ref 96–108)
CO2 SERPL-SCNC: 18 MMOL/L — LOW (ref 22–31)
CREAT SERPL-MCNC: 1.11 MG/DL — SIGNIFICANT CHANGE UP (ref 0.5–1.3)
EGFR: 54 ML/MIN/1.73M2 — LOW
GLUCOSE SERPL-MCNC: 115 MG/DL — HIGH (ref 70–99)
HCT VFR BLD CALC: 32.8 % — LOW (ref 34.5–45)
HGB BLD-MCNC: 10.6 G/DL — LOW (ref 11.5–15.5)
MAGNESIUM SERPL-MCNC: 1.6 MG/DL — SIGNIFICANT CHANGE UP (ref 1.6–2.6)
MCHC RBC-ENTMCNC: 30.1 PG — SIGNIFICANT CHANGE UP (ref 27–34)
MCHC RBC-ENTMCNC: 32.3 GM/DL — SIGNIFICANT CHANGE UP (ref 32–36)
MCV RBC AUTO: 93.2 FL — SIGNIFICANT CHANGE UP (ref 80–100)
PHOSPHATE SERPL-MCNC: 2.7 MG/DL — SIGNIFICANT CHANGE UP (ref 2.5–4.5)
PLATELET # BLD AUTO: 236 K/UL — SIGNIFICANT CHANGE UP (ref 150–400)
POTASSIUM SERPL-MCNC: 3.4 MMOL/L — LOW (ref 3.5–5.3)
POTASSIUM SERPL-SCNC: 3.4 MMOL/L — LOW (ref 3.5–5.3)
RBC # BLD: 3.52 M/UL — LOW (ref 3.8–5.2)
RBC # FLD: 14.1 % — SIGNIFICANT CHANGE UP (ref 10.3–14.5)
SODIUM SERPL-SCNC: 140 MMOL/L — SIGNIFICANT CHANGE UP (ref 135–145)
WBC # BLD: 6.26 K/UL — SIGNIFICANT CHANGE UP (ref 3.8–10.5)
WBC # FLD AUTO: 6.26 K/UL — SIGNIFICANT CHANGE UP (ref 3.8–10.5)

## 2023-05-09 PROCEDURE — 99232 SBSQ HOSP IP/OBS MODERATE 35: CPT

## 2023-05-09 RX ORDER — ACETAMINOPHEN 500 MG
650 TABLET ORAL EVERY 6 HOURS
Refills: 0 | Status: DISCONTINUED | OUTPATIENT
Start: 2023-05-09 | End: 2023-05-10

## 2023-05-09 RX ORDER — POTASSIUM CHLORIDE 20 MEQ
40 PACKET (EA) ORAL ONCE
Refills: 0 | Status: COMPLETED | OUTPATIENT
Start: 2023-05-09 | End: 2023-05-09

## 2023-05-09 RX ORDER — ACETAMINOPHEN 500 MG
1000 TABLET ORAL ONCE
Refills: 0 | Status: COMPLETED | OUTPATIENT
Start: 2023-05-09 | End: 2023-05-09

## 2023-05-09 RX ADMIN — Medication 650 MILLIGRAM(S): at 14:23

## 2023-05-09 RX ADMIN — Medication 650 MILLIGRAM(S): at 15:26

## 2023-05-09 RX ADMIN — CEFTRIAXONE 2000 MILLIGRAM(S): 500 INJECTION, POWDER, FOR SOLUTION INTRAMUSCULAR; INTRAVENOUS at 15:43

## 2023-05-09 RX ADMIN — ENOXAPARIN SODIUM 30 MILLIGRAM(S): 100 INJECTION SUBCUTANEOUS at 22:38

## 2023-05-09 RX ADMIN — Medication 400 MILLIGRAM(S): at 19:34

## 2023-05-09 RX ADMIN — Medication 3 MILLIGRAM(S): at 22:38

## 2023-05-09 RX ADMIN — Medication 1000 MILLIGRAM(S): at 20:04

## 2023-05-09 RX ADMIN — Medication 1000 MILLIGRAM(S): at 10:09

## 2023-05-09 RX ADMIN — Medication 40 MILLIEQUIVALENT(S): at 09:55

## 2023-05-09 RX ADMIN — ATORVASTATIN CALCIUM 10 MILLIGRAM(S): 80 TABLET, FILM COATED ORAL at 22:38

## 2023-05-09 RX ADMIN — Medication 1000 UNIT(S): at 09:49

## 2023-05-09 RX ADMIN — Medication 650 MILLIGRAM(S): at 01:53

## 2023-05-09 RX ADMIN — Medication 400 MILLIGRAM(S): at 10:04

## 2023-05-09 RX ADMIN — Medication 650 MILLIGRAM(S): at 02:00

## 2023-05-09 NOTE — PROGRESS NOTE ADULT - ASSESSMENT
68 yo F with PMHx of HLD,  Crohn's disease, s/p colostomy and ileostomy, breast cancer s/p radiation and  chemo,  lyme disease, Nephrolithiasis had recent cystoscopy admitted for:       1.  Febrile syndrome/Sepsis due to UTI. Nephrolithiasis   Fever, Low BP, elevated BUN/CR + source   Lactate WNL; s/p IVF Bolus, now on IVFs   Ceftriaxone 2g IV daily   F/u BCX and UCX  appreciate ID and Uro recs       Elevated BUN/CR likely SHEA  possibly prerenal  and postrenal?   Check CT  abd/pelvis to r/o obstruction   IVF - can dc, encourage oral hydration   Monitor UO   Pt counseled to avoid nephrotoxic meds incl NSAIDS     HLP  C/w Lipitor     VTE PX lovenox   FULL CODE

## 2023-05-09 NOTE — PROGRESS NOTE ADULT - SUBJECTIVE AND OBJECTIVE BOX
Melissa with [address] and [facility] requesting call back from nurse regarding patient.  Please f/u CC: 66 yo F with PMHx of Crohn's disease, s/p colostomy and ileostomy, breast cancer s/p radiation, chemo,  lyme disease presented  to ED c/o fever for 4 days, Tmax 104,  associated  with HA, feels dehydrated.  Pt reports  that had dysuria and pain, " i think my stone was moving" , " i think I passed  the stone". Reports that had recent cystoscopy with Dr Guillory. Pt was  taking ibuprofen every 6 hours for pain and fevers.   Reports last visit to ED on March 11 for RLQ pain, was discharged with nephrolithiases. Reports she feels like she passed the stone this week.    5/9 - no cp palps sob abdo pain flank pain or dysuria; has ha, to get ofirmev - on revisit, has improved     Vital Signs Last 24 Hrs  T(C): 37.4 (09 May 2023 08:47), Max: 37.7 (09 May 2023 02:42)  T(F): 99.3 (09 May 2023 08:47), Max: 99.8 (09 May 2023 02:42)  HR: 85 (09 May 2023 08:47) (64 - 85)  BP: 158/85 (09 May 2023 08:47) (114/56 - 158/85)  RR: 17 (09 May 2023 08:47) (17 - 18)  SpO2: 98% (09 May 2023 08:47) (96% - 98%)/RA   Constitutional: NAD, awake and alert  HEENT: PERR, EOMI  Neck: Soft and supple,  No JVD  Respiratory: Breath sounds are clear bilaterally, No wheezing, rales or rhonchi  Cardiovascular: S1 and S2, regular rate and rhythm, no Murmurs  Gastrointestinal: Bowel Sounds present, soft, nontender, nondistended, ileostomy with stool   Extremities: No peripheral edema  Vascular: 2+ peripheral pulses  Neurological: A/O x 3, no focal deficits  Musculoskeletal: 5/5 strength b/l upper and lower extremities  Skin: No rashes        RADIOLOGY/EKG:  < from: CT Abdomen and Pelvis No Cont (05.07.23 @ 16:10) >  IMPRESSION: 3 mm nonobstructing left renal calculus.      LABS: All Labs Reviewed:                        10.6   6.26  )-----------( 236      ( 09 May 2023 06:22 )             32.8     140  |  113<H>  |  12  ----------------------------<  115<H>  3.4<L>   |  18<L>  |  1.11    Ca    8.5      09 May 2023 06:22  Phos  2.7     05-09  Mg     1.6     05-09          acetaminophen     Tablet .. 650 milliGRAM(s) Oral every 6 hours PRN  aluminum hydroxide/magnesium hydroxide/simethicone Suspension 30 milliLiter(s) Oral every 4 hours PRN  atorvastatin 10 milliGRAM(s) Oral at bedtime  cefTRIAXone Injectable. 2000 milliGRAM(s) IV Push every 24 hours  cholecalciferol 1000 Unit(s) Oral daily  enoxaparin Injectable 30 milliGRAM(s) SubCutaneous every 24 hours  melatonin 3 milliGRAM(s) Oral at bedtime PRN  ondansetron Injectable 4 milliGRAM(s) IV Push every 6 hours PRN

## 2023-05-09 NOTE — PROGRESS NOTE ADULT - SUBJECTIVE AND OBJECTIVE BOX
Date of service: 23 @ 08:07    Lying in bed in NAD  Has urinary frequency  Fever is down  Tmax 99.8F    ROS: no fever or chills; denies dizziness, no HA, no SOB or cough, no abdominal pain, no diarrhea or constipation; no legs pain, no rashes    MEDICATIONS  (STANDING):  atorvastatin 10 milliGRAM(s) Oral at bedtime  cefTRIAXone Injectable. 2000 milliGRAM(s) IV Push every 24 hours  cholecalciferol 1000 Unit(s) Oral daily  enoxaparin Injectable 30 milliGRAM(s) SubCutaneous every 24 hours    Vital Signs Last 24 Hrs  T(C): 36.9 (09 May 2023 05:20), Max: 37.7 (09 May 2023 02:42)  T(F): 98.5 (09 May 2023 05:20), Max: 99.8 (09 May 2023 02:42)  HR: 64 (09 May 2023 05:20) (64 - 79)  BP: 114/56 (09 May 2023 05:20) (114/56 - 138/62)  BP(mean): --  RR: 18 (09 May 2023 05:20) (18 - 18)  SpO2: 96% (09 May 2023 05:20) (95% - 98%)    Parameters below as of 09 May 2023 05:20  Patient On (Oxygen Delivery Method): room air     Physical exam:    Constitutional:  No acute distress  HEENT: NC/AT, EOMI, PERRLA, conjunctivae clear; ears and nose atraumatic  Neck: supple; thyroid not palpable  Back: no tenderness  Respiratory: respiratory effort normal; clear to auscultation  Cardiovascular: S1S2 regular, no murmurs  Abdomen: soft, not tender, not distended, positive BS  Colostomy  Genitourinary: no suprapubic tenderness, no flank pain  Lymphatic: no LN palpable  Musculoskeletal: no muscle tenderness, no joint swelling or tenderness  Extremities: no pedal edema  Neurological/ Psychiatric: AxOx3, judgement and insight normal; moving all extremities  Skin: no rashes; no palpable lesions    Labs: reviewed                        10.6   6.26  )-----------( 236      ( 09 May 2023 06:22 )             32.8     05-09    140  |  113<H>  |  12  ----------------------------<  115<H>  3.4<L>   |  18<L>  |  1.11    Ca    8.5      09 May 2023 06:22  Phos  2.7     05-09  Mg     1.6     05-09    TPro  8.1  /  Alb  3.3  /  TBili  0.9  /  DBili  x   /  AST  43<H>  /  ALT  43  /  AlkPhos  97  05-07                        10.2   5.61  )-----------( 203      ( 08 May 2023 05:48 )             32.6     05-08    137  |  113<H>  |  17  ----------------------------<  128<H>  3.5   |  17<L>  |  1.28    Ca    8.1<L>      08 May 2023 05:48    TPro  8.1  /  Alb  3.3  /  TBili  0.9  /  DBili  x   /  AST  43<H>  /  ALT  43  /  AlkPhos  97  05-07     LIVER FUNCTIONS - ( 07 May 2023 09:45 )  Alb: 3.3 g/dL / Pro: 8.1 gm/dL / ALK PHOS: 97 U/L / ALT: 43 U/L / AST: 43 U/L / GGT: x           Urinalysis Basic - ( 07 May 2023 09:45 )    Color: Yellow / Appearance: very cloudy / S.015 / pH: x  Gluc: x / Ketone: Negative  / Bili: Negative / Urobili: Negative   Blood: x / Protein: 100 / Nitrite: Negative   Leuk Esterase: Moderate / RBC: 3-5 /HPF / WBC 26-50 /HPF   Sq Epi: x / Non Sq Epi: x / Bacteria: Moderate    ( @ 09:45)  NotDetec    Culture - Urine (collected 07 May 2023 09:45)  Source: Clean Catch None  Final Report (08 May 2023 16:42):    <10,000 CFU/mL Normal Urogenital Tata    Culture - Blood (collected 07 May 2023 09:45)  Source: .Blood None  Preliminary Report (08 May 2023 15:02):    No growth to date.    Culture - Blood (collected 07 May 2023 09:45)  Source: .Blood None  Preliminary Report (08 May 2023 15:02):    No growth to date.    Radiology: all available radiological tests reviewed    < from: CT Abdomen and Pelvis No Cont (23 @ 16:10) >  IMPRESSION: 3 mm nonobstructing left renal calculus.  < end of copied text >      Advanced directives addressed: full resuscitation

## 2023-05-09 NOTE — PROGRESS NOTE ADULT - ASSESSMENT
66 y/o Female with h/o Crohn's disease, s/p colostomy and ileostomy, breast cancer s/p radiation and chemo, prior Lyme disease, kidney stones was admitted on 5/7 for fever x 4 days, Tmax 104F,  associated with HA and increased weakness. She felt dehydrated. Pt reported dysuria and pain, "I think my stone was moving", " i think I passed  the stone". Reported a recent cystoscopy with Dr Guillory. Pt was  taking ibuprofen every 6 hours for pain and fevers. In ED: fever 100.4, BP borderline. She received IV Ceftriaxone.    1. Febrile syndrome. Probable sepsis. Pyuria. Probable UTI. Left kidney stones. CRF stage 3. Crohn's disease. Immunocompromised host.   -BC x 2, urine c/s noted  -on ceftriaxone 2 gm IV qd # 2  -tolerating abx well so far; no side effects noted  -urology evaluation appreciated  -continue abx coverage   -monitor temps  -f/u CBC  -supportive care  2. Other issues:   -care per medicine

## 2023-05-10 ENCOUNTER — TRANSCRIPTION ENCOUNTER (OUTPATIENT)
Age: 68
End: 2023-05-10

## 2023-05-10 VITALS
OXYGEN SATURATION: 98 % | DIASTOLIC BLOOD PRESSURE: 78 MMHG | RESPIRATION RATE: 18 BRPM | SYSTOLIC BLOOD PRESSURE: 112 MMHG | TEMPERATURE: 98 F | HEART RATE: 72 BPM

## 2023-05-10 PROCEDURE — 99239 HOSP IP/OBS DSCHRG MGMT >30: CPT

## 2023-05-10 RX ORDER — CEFUROXIME AXETIL 250 MG
1 TABLET ORAL
Qty: 20 | Refills: 0
Start: 2023-05-10 | End: 2023-05-19

## 2023-05-10 RX ORDER — ACETAMINOPHEN 500 MG
2 TABLET ORAL
Qty: 0 | Refills: 0 | DISCHARGE
Start: 2023-05-10

## 2023-05-10 RX ADMIN — CEFTRIAXONE 2000 MILLIGRAM(S): 500 INJECTION, POWDER, FOR SOLUTION INTRAMUSCULAR; INTRAVENOUS at 14:49

## 2023-05-10 RX ADMIN — Medication 650 MILLIGRAM(S): at 09:20

## 2023-05-10 RX ADMIN — Medication 1000 UNIT(S): at 09:20

## 2023-05-10 NOTE — DISCHARGE NOTE PROVIDER - NSDCCPCAREPLAN_GEN_ALL_CORE_FT
PRINCIPAL DISCHARGE DIAGNOSIS  Diagnosis: Acute UTI  Assessment and Plan of Treatment: suspected UTI, in the setting of renal stones  please take antibiotics as prescribed.

## 2023-05-10 NOTE — DISCHARGE NOTE PROVIDER - HOSPITAL COURSE
CC: 68 yo F with PMHx of Crohn's disease, s/p colostomy and ileostomy, breast cancer s/p radiation, chemo,  lyme disease presented  to ED c/o fever for 4 days, Tmax 104,  associated  with HA, feels dehydrated.  Pt reports  that had dysuria and pain, " i think my stone was moving" , " i think I passed  the stone". Reports that had recent cystoscopy with Dr Guillory. Pt was  taking ibuprofen every 6 hours for pain and fevers.   Reports last visit to ED on March 11 for RLQ pain, was discharged with nephrolithiases. Reports she feels like she passed the stone this week.    HOSPITAL COURSE:   Febrile syndrome/Suspected UTI. Nephrolithiasis   Fever, Low BP, elevated BUN/CR + source   Lactate WNL; s/p IVF Bolus, now on IVFs   Ceftriaxone 2g IV daily   UA suspicious for UTI ; F/u BCX and UCX- NTD  appreciate ID and Uro recs - dc home on ceftin     Elevated BUN/CR likely SHEA  possibly prerenal  and postrenal?   Check CT  abd/pelvis to r/o obstruction  IVF - can dc, encourage oral hydration   Pt counseled to avoid nephrotoxic meds incl NSAIDS     HLP  C/w Lipitor     OTHER DETAILS:   5/10 - headaches better; no cp palps sob abdo pain. ambualting     PHYSICAL EXAM:  T(F): 98.7 (10 May 2023 09:03), Max: 98.7 (10 May 2023 09:03)  HR: 74 (10 May 2023 09:03) (66 - 74)  BP: 111/76 (10 May 2023 09:03) (111/76 - 115/54)  RR: 17 (10 May 2023 09:03) (17 - 18)  SpO2: 97% (10 May 2023 09:03) (97% - 97%)/RA   GENERAL: NAD, able to lie flat in bed  HEAD:  Atraumatic, Normocephalic  EYES: EOMI, PERRLA, normal sclera  ENT: Moist mucous membranes  NECK: Supple, No JVD, no nuchal rigidity  CHEST/LUNG: Clear to auscultation bilaterally; No rales, rhonchi, wheezing, or rubs. Unlabored respirations  HEART: Regular rate and rhythm; No murmurs, rubs, or gallops  ABDOMEN: Bowel sounds present; Soft, Nontender, Nondistended. Ostomy with stool   EXTREMITIES:  no pitting bilaterally  NERVOUS SYSTEM:  Alert & Oriented X3, speech clear. No focal motor or sensory deficits  MSK: FROM all 4 extremities, full and equal strength  SKIN: No rashes or lesions    LABS: All Labs Reviewed:                    10.6   6.26  )-----------( 236      ( 09 May 2023 06:22 )             32.8   140  |  113<H>  |  12  ----------------------------<  115<H>  3.4<L>   |  18<L>  |  1.1  RADIOLOGY/EKG:  < from: CT Abdomen and Pelvis No Cont (05.07.23 @ 16:10) >  IMPRESSION: 3 mm nonobstructing left renal calculus.    time spent on discharge - 55 mins

## 2023-05-10 NOTE — PROGRESS NOTE ADULT - ASSESSMENT
68 y/o Female with h/o Crohn's disease, s/p colostomy and ileostomy, breast cancer s/p radiation and chemo, prior Lyme disease, kidney stones was admitted on 5/7 for fever x 4 days, Tmax 104F,  associated with HA and increased weakness. She felt dehydrated. Pt reported dysuria and pain, "I think my stone was moving", " i think I passed  the stone". Reported a recent cystoscopy with Dr Guillory. Pt was  taking ibuprofen every 6 hours for pain and fevers. In ED: fever 100.4, BP borderline. She received IV Ceftriaxone.    1. Febrile syndrome resolving. Pyuria. Probable UTI. Left kidney stones. CRF stage 3. Crohn's disease. Immunocompromised host.   -BC x 2, urine c/s noted  -on ceftriaxone 2 gm IV qd # 3  -tolerating abx well so far; no side effects noted  -urology evaluation appreciated  -urine culture shows no growth  -may change abx to ceftin 500 mg PO q12h for 10 more days  -monitor temps  -f/u CBC  -supportive care  2. Other issues:   -care per medicine

## 2023-05-10 NOTE — DISCHARGE NOTE NURSING/CASE MANAGEMENT/SOCIAL WORK - PATIENT PORTAL LINK FT
You can access the FollowMyHealth Patient Portal offered by [facility] by registering at the following website: [email or web address]. By joining [facility]’s FollowMyHealth portal, you will also be able to view your health information using other applications (apps) compatible with our system.

## 2023-05-10 NOTE — DISCHARGE NOTE PROVIDER - CARE PROVIDERS DIRECT ADDRESSES
[email or web address]

## 2023-05-10 NOTE — DISCHARGE NOTE NURSING/CASE MANAGEMENT/SOCIAL WORK - NSDCPETBCESMAN_GEN_ALL_CORE
Acute on chronic systolic CHF with volume overload  - strict I/o  - Continue Bumex 1 mg po bid with goal fluid balance net negative  - Low salt diet  - Fluid restriction to <1L/day  - Defer Entresto/ARB use with out patient cardiology for CHF indication with close monitoring of BMP If you are a smoker, it is important for your health to stop smoking. Please be aware that second hand smoke is also harmful. Acute on chronic systolic CHF with volume overload  - strict I/o  - Continue Bumex 1 mg po bid with goal fluid balance net negative  - Low salt diet  - Fluid restriction to <1L/day  - Defer Entresto/ARB use with out patient cardiology for CHF indication with close monitoring of BMP.

## 2023-05-10 NOTE — PROGRESS NOTE ADULT - SUBJECTIVE AND OBJECTIVE BOX
Date of service: 05-10-23 @ 07:08    Lying in bed in NAD  Dysuria is improving  Has HA  No sinus or nasal congestion  Fever is down    ROS: denies dizziness, no HA, no SOB or cough, no abdominal pain, no diarrhea or constipation; no legs pain, no rashes    MEDICATIONS  (STANDING):  atorvastatin 10 milliGRAM(s) Oral at bedtime  cefTRIAXone Injectable. 2000 milliGRAM(s) IV Push every 24 hours  cholecalciferol 1000 Unit(s) Oral daily  enoxaparin Injectable 30 milliGRAM(s) SubCutaneous every 24 hours    Vital Signs Last 24 Hrs  T(C): 36.5 (10 May 2023 05:16), Max: 37.4 (09 May 2023 08:47)  T(F): 97.7 (10 May 2023 05:16), Max: 99.3 (09 May 2023 08:47)  HR: 66 (10 May 2023 05:16) (66 - 85)  BP: 115/54 (10 May 2023 05:16) (115/54 - 158/85)  BP(mean): --  RR: 18 (10 May 2023 05:16) (17 - 18)  SpO2: 97% (10 May 2023 05:16) (97% - 98%)    Parameters below as of 10 May 2023 05:16  Patient On (Oxygen Delivery Method): room air     Physical exam:    Constitutional:  No acute distress  HEENT: NC/AT, EOMI, PERRLA, conjunctivae clear; ears and nose atraumatic  Neck: supple; thyroid not palpable  Back: no tenderness  Respiratory: respiratory effort normal; clear to auscultation  Cardiovascular: S1S2 regular, no murmurs  Abdomen: soft, not tender, not distended, positive BS  Colostomy  Genitourinary: no suprapubic tenderness, no flank pain  Lymphatic: no LN palpable  Musculoskeletal: no muscle tenderness, no joint swelling or tenderness  Extremities: no pedal edema  Neurological/ Psychiatric: AxOx3, judgement and insight normal; moving all extremities  Skin: no rashes; no palpable lesions    Labs: reviewed                        10.6   6.26  )-----------( 236      ( 09 May 2023 06:22 )             32.8     05-09    140  |  113<H>  |  12  ----------------------------<  115<H>  3.4<L>   |  18<L>  |  1.11    Ca    8.5      09 May 2023 06:22  Phos  2.7     05-09  Mg     1.6     05-09    TPro  8.1  /  Alb  3.3  /  TBili  0.9  /  DBili  x   /  AST  43<H>  /  ALT  43  /  AlkPhos  97  05-07                        10.2   5.61  )-----------( 203      ( 08 May 2023 05:48 )             32.6     05-08    137  |  113<H>  |  17  ----------------------------<  128<H>  3.5   |  17<L>  |  1.28    Ca    8.1<L>      08 May 2023 05:48    TPro  8.1  /  Alb  3.3  /  TBili  0.9  /  DBili  x   /  AST  43<H>  /  ALT  43  /  AlkPhos  97  05-07     LIVER FUNCTIONS - ( 07 May 2023 09:45 )  Alb: 3.3 g/dL / Pro: 8.1 gm/dL / ALK PHOS: 97 U/L / ALT: 43 U/L / AST: 43 U/L / GGT: x           Urinalysis Basic - ( 07 May 2023 09:45 )    Color: Yellow / Appearance: very cloudy / S.015 / pH: x  Gluc: x / Ketone: Negative  / Bili: Negative / Urobili: Negative   Blood: x / Protein: 100 / Nitrite: Negative   Leuk Esterase: Moderate / RBC: 3-5 /HPF / WBC 26-50 /HPF   Sq Epi: x / Non Sq Epi: x / Bacteria: Moderate    ( @ 09:45)  NotDete    Culture - Urine (collected 07 May 2023 09:45)  Source: Clean Catch None  Final Report (08 May 2023 16:42):    <10,000 CFU/mL Normal Urogenital Tata    Culture - Blood (collected 07 May 2023 09:45)  Source: .Blood None  Preliminary Report (08 May 2023 15:02):    No growth to date.    Culture - Blood (collected 07 May 2023 09:45)  Source: .Blood None  Preliminary Report (08 May 2023 15:02):    No growth to date.    Radiology: all available radiological tests reviewed    < from: CT Abdomen and Pelvis No Cont (23 @ 16:10) >  IMPRESSION: 3 mm nonobstructing left renal calculus.  < end of copied text >      Advanced directives addressed: full resuscitation

## 2023-05-10 NOTE — DISCHARGE NOTE PROVIDER - CARE PROVIDER_API CALL
Gil Guillory)  Urology  [address], [address] [place] [address]  Phone: [phone]  Fax: [phone]  Follow Up Time: 2 weeks    Darin Austin  [facility]  [address] [place] [address]  Phone: [phone]  Fax: [phone]  Follow Up Time: 1 week

## 2023-05-10 NOTE — DISCHARGE NOTE PROVIDER - NSDCMRMEDTOKEN_GEN_ALL_CORE_FT
acetaminophen 325 mg oral tablet: 2 tab(s) orally every 6 hours As needed Temp greater or equal to 38C (100.4F), Mild Pain (1 - 3)  atorvastatin 10 mg oral tablet: 1 tab(s) orally once a day  cefuroxime 500 mg oral tablet: 1 tab(s) orally every 12 hours  potassium citrate-citric acid 1100 mg-334 mg/5 mL oral liquid: 10 orally ***Take 15 milliliters (1 tablespoonful) by mouth four times a day  Vitamin D3 1000 intl units oral tablet: 1 tab(s) orally once a day

## 2023-05-10 NOTE — DISCHARGE NOTE PROVIDER - PROVIDER TOKENS
PROVIDER:[TOKEN:[[id number]],FOLLOWUP:[2 weeks]],PROVIDER:[TOKEN:[[id number]],FOLLOWUP:[1 week]]

## 2023-05-12 DIAGNOSIS — Z86.16 PERSONAL HISTORY OF COVID-19: ICD-10-CM

## 2023-05-12 DIAGNOSIS — R51.9 HEADACHE, UNSPECIFIED: ICD-10-CM

## 2023-05-12 DIAGNOSIS — D84.9 IMMUNODEFICIENCY, UNSPECIFIED: ICD-10-CM

## 2023-05-12 DIAGNOSIS — J44.9 CHRONIC OBSTRUCTIVE PULMONARY DISEASE, UNSPECIFIED: ICD-10-CM

## 2023-05-12 DIAGNOSIS — R50.9 FEVER, UNSPECIFIED: ICD-10-CM

## 2023-05-12 DIAGNOSIS — Z86.19 PERSONAL HISTORY OF OTHER INFECTIOUS AND PARASITIC DISEASES: ICD-10-CM

## 2023-05-12 DIAGNOSIS — Z88.8 ALLERGY STATUS TO OTHER DRUGS, MEDICAMENTS AND BIOLOGICAL SUBSTANCES: ICD-10-CM

## 2023-05-12 DIAGNOSIS — Z80.42 FAMILY HISTORY OF MALIGNANT NEOPLASM OF PROSTATE: ICD-10-CM

## 2023-05-12 DIAGNOSIS — E78.5 HYPERLIPIDEMIA, UNSPECIFIED: ICD-10-CM

## 2023-05-12 DIAGNOSIS — N39.0 URINARY TRACT INFECTION, SITE NOT SPECIFIED: ICD-10-CM

## 2023-05-12 DIAGNOSIS — Z83.6 FAMILY HISTORY OF OTHER DISEASES OF THE RESPIRATORY SYSTEM: ICD-10-CM

## 2023-05-12 DIAGNOSIS — A41.9 SEPSIS, UNSPECIFIED ORGANISM: ICD-10-CM

## 2023-05-12 DIAGNOSIS — N20.0 CALCULUS OF KIDNEY: ICD-10-CM

## 2023-05-12 DIAGNOSIS — Z90.49 ACQUIRED ABSENCE OF OTHER SPECIFIED PARTS OF DIGESTIVE TRACT: ICD-10-CM

## 2023-05-12 DIAGNOSIS — N18.30 CHRONIC KIDNEY DISEASE, STAGE 3 UNSPECIFIED: ICD-10-CM

## 2023-05-12 DIAGNOSIS — Z85.3 PERSONAL HISTORY OF MALIGNANT NEOPLASM OF BREAST: ICD-10-CM

## 2023-05-12 DIAGNOSIS — Z83.3 FAMILY HISTORY OF DIABETES MELLITUS: ICD-10-CM

## 2023-05-12 DIAGNOSIS — Z82.49 FAMILY HISTORY OF ISCHEMIC HEART DISEASE AND OTHER DISEASES OF THE CIRCULATORY SYSTEM: ICD-10-CM

## 2023-05-12 DIAGNOSIS — Z91.041 RADIOGRAPHIC DYE ALLERGY STATUS: ICD-10-CM

## 2023-05-12 DIAGNOSIS — K50.90 CROHN'S DISEASE, UNSPECIFIED, WITHOUT COMPLICATIONS: ICD-10-CM

## 2023-05-26 ENCOUNTER — APPOINTMENT (OUTPATIENT)
Dept: UROLOGY | Facility: CLINIC | Age: 68
End: 2023-05-26
Payer: MEDICARE

## 2023-05-26 DIAGNOSIS — N34.3 URETHRAL SYNDROME, UNSPECIFIED: ICD-10-CM

## 2023-05-26 DIAGNOSIS — N20.0 CALCULUS OF KIDNEY: ICD-10-CM

## 2023-05-26 PROCEDURE — 99214 OFFICE O/P EST MOD 30 MIN: CPT

## 2023-05-26 RX ORDER — NITROFURANTOIN MACROCRYSTALS 100 MG/1
100 CAPSULE ORAL
Qty: 28 | Refills: 0 | Status: ACTIVE | COMMUNITY
Start: 2023-05-26 | End: 1900-01-01

## 2023-05-26 RX ORDER — OXYCODONE AND ACETAMINOPHEN 5; 325 MG/1; MG/1
5-325 TABLET ORAL
Qty: 15 | Refills: 0 | Status: ACTIVE | COMMUNITY
Start: 2023-05-26 | End: 1900-01-01

## 2023-05-30 NOTE — END OF VISIT
[FreeTextEntry3] : I noted to her that with her stone being loosened, she would need ureteroscopy and a hunt through the kidney to find the stone. She will consider this or continue to be observed on preventative medications. I prescribed Nitrofurantoin and Oxycodone to be taken as needed for her upcoming trip.

## 2023-05-30 NOTE — ASSESSMENT
[FreeTextEntry1] : Small loosened calculus and history of urinary sepsis. Pt going on trip to [place] soon.

## 2023-05-30 NOTE — HISTORY OF PRESENT ILLNESS
[FreeTextEntry1] : 67F presents today with a CC of a renal stone. She has a history of urinary infections with sepsis. Pt has a stone which was not seen on a [facility] x-ray. It did not dissolve with treatment of alkalinization. In the past several weeks, she had been hospitalized for urinary sepsis. She does not have a history of  issues prior to this.

## 2023-08-17 NOTE — ED ADULT TRIAGE NOTE - PRO INTERPRETER NEED 2
[facility] - PODIATRY    Today's Date: 08/17/23    Patient Name: Aimee Cantu  MRN: [id number]  CSN: [id number]  PCP: Rebecca Brown MD, Last PCP Visit:  7/11/2023  Referring Provider: No ref. provider found    SUBJECTIVE     Chief Complaint   Patient presents with    Left Foot - Follow-up, Nail Problem    Right Foot - Follow-up, Nail Problem       HPI: Aimee Cantu, a 79 y.o.female, comes to clinic.    New, Established, New Problem:  established   Location:  Toenails  Duration:   Greater than five years  Onset:  Gradual  Nature:  sore with palpation.  Stable, worsening, improving:   Recurring  Aggravating factors:  Pain with shoe gear and ambulation.  Previous Treatment:  debridement    Patient reports the following medical changes since their last visit:  none.    Numbness in her feet.    Medical changes:  no changes.    Patient denies any fevers, chills, nausea, vomiting, shortness of breath, nor any other constitutional signs nor symptoms.       Past Medical History:   Diagnosis Date    Bilateral foot pain     Colon cancer     Colon polyps     Disease of thyroid gland     Hyperlipidemia     Ingrowing nail     Polyneuropathy     Tinea unguium      Past Surgical History:   Procedure Laterality Date    ABDOMINAL HYSTERECTOMY      APPENDECTOMY      CHOLECYSTECTOMY      COLON SURGERY      COLONOSCOPY      2018 w Dr. Courtney    COLONOSCOPY N/A 3/27/2023    Procedure: COLONOSCOPY WITH POLYPECTOMY;  Surgeon: Juan Courtney MD;  Location: [facility] END[facility]COPY;  Service: Gastroenterology;  Laterality: N/A;  COLON POLYP AND PREVIOUS COLON SURGERY    ENDOSCOPY      HAND SURGERY      HERNIA REPAIR      HYSTERECTOMY      PORTACATH PLACEMENT      SMALL INTESTINE SURGERY      WRIST SURGERY       Family History   Problem Relation Age of Onset    Diabetes Other     Malig Hyperthermia Neg Hx      Social History     Socioeconomic History    Marital status:    Tobacco Use    Smoking status: Never     Smokeless tobacco: Never   Vaping Use    Vaping Use: Never used   Substance and Sexual Activity    Alcohol use: Never    Drug use: Never    Sexual activity: Yes     Partners: Male     No Known Allergies  Current Outpatient Medications   Medication Sig Dispense Refill    atorvastatin (LIPITOR) 80 MG tablet Take 1 tablet by mouth Every Night.      clonazePAM (KlonoPIN) 0.5 MG tablet Take 1 tablet by mouth every night at bedtime.      cyanocobalamin 1000 MCG/ML injection INJECT 1ML IN THE MUSCLE AS DIRECTED BY PRESCRIBER ONCE A MONTH      gabapentin (NEURONTIN) 600 MG tablet gabapentin 600 mg oral tablet take 2 tablets by oral route 3 times a day   Active      ibandronate (BONIVA) 150 MG tablet Once weekly      omega-3 acid ethyl esters (LOVAZA) 1 g capsule 1 capsule.      Synthroid 112 MCG tablet Take 1 tablet by mouth Daily. 3 tabs      traMADol (ULTRAM) 50 MG tablet Take 1 tablet by mouth Every 6 (Six) Hours As Needed.      vitamin D (ERGOCALCIFEROL) 1.25 MG (23123 UT) capsule capsule Take 1 capsule by mouth 1 (One) Time Per Week.       No current facility-administered medications for this visit.     Review of Systems   Constitutional: Negative.    Skin:         Painful toenails   Neurological:  Positive for numbness.   All other systems reviewed and are negative.    OBJECTIVE     Vitals:    08/17/23 1536   BP: 164/67   Pulse: 78   Temp: 97.4 øF (36.3 øC)   SpO2: 93%         Patient seen in no apparent distress.      PHYSICAL EXAM:     Foot/Ankle Exam    GENERAL  Diabetic foot exam performed    Appearance:  elderly  Orientation:  AAOx3  Affect:  appropriate  Gait:  unimpaired  Assistance:  independent  Right shoe gear: casual shoe  Left shoe gear: casual shoe    VASCULAR     Right Foot Vascularity   Dorsalis pedis:  1+  Posterior tibial:  1+  Skin temperature:  warm  Edema grading:  None  CFT:  < 3 seconds  Pedal hair growth:  Absent  Varicosities:  mild varicosities     Left Foot Vascularity   Dorsalis pedis:   1+  Posterior tibial:  1+  Skin temperature:  warm  Edema grading:  None  CFT:  < 3 seconds  Pedal hair growth:  Absent  Varicosities:  mild varicosities     NEUROLOGIC     Right Foot Neurologic   Light touch sensation: diminished  Vibratory sensation: diminished  Hot/Cold sensation: diminished  Protective Sensation using [place] Monofilament:   Sites intact: 2  Sites tested: 10     Left Foot Neurologic   Light touch sensation: diminished  Vibratory sensation: diminished  Hot/Cold sensation:  diminished  Protective Sensation using [place] Monofilament:   Sites intact: 2  Sites tested: 10    MUSCLE STRENGTH     Right Foot Muscle Strength   Foot dorsiflexion:  4  Foot plantar flexion:  4  Foot inversion:  4  Foot eversion:  4     Left Foot Muscle Strength   Foot dorsiflexion:  4  Foot plantar flexion:  4  Foot inversion:  4  Foot eversion:  4    RANGE OF MOTION     Right Foot Range of Motion   Foot and ankle ROM within normal limits       Left Foot Range of Motion   Foot and ankle ROM within normal limits      DERMATOLOGIC      Right Foot Dermatologic   Skin  Right foot skin is intact.   Nails  1.  Positive for elongated, onychomycosis, abnormal thickness, subungual debris and ingrown toenail.  2.  Positive for elongated, onychomycosis, abnormal thickness, subungual debris and ingrown toenail.  3.  Positive for elongated, onychomycosis, abnormal thickness, subungual debris and ingrown toenail.  4.  Positive for elongated, onychomycosis, abnormal thickness, subungual debris and ingrown toenail.  5.  Positive for elongated, onychomycosis, abnormal thickness, subungual debris and ingrown toenail.     Left Foot Dermatologic   Skin  Left foot skin is intact.   Nails  1.  Positive for elongated, onychomycosis, abnormal thickness, subungual debris and ingrown toenail.  2.  Positive for elongated, onychomycosis, abnormal thickness, subungual debris and ingrown toenail.  3.  Positive for elongated,  onychomycosis, abnormal thickness, subungual debris and ingrown toenail.  4.  Positive for elongated, onychomycosis, abnormally thick, subungual debris and ingrown toenail.  5.  Positive for elongated, onychomycosis, abnormally thick, subungual debris and ingrown toenail.    ASSESSMENT/PLAN     Diagnoses and all orders for this visit:    1. Neuropathy (Primary)    2. Foot pain, bilateral    3. Onychomycosis    4. Onychocryptosis        Comprehensive lower extremity examination and evaluation was performed.    Discussed findings and treatment plan including risks, benefits, and treatment options with patient in detail. Patient agreed with treatment plan.    Toenails 1, 2, 3, 4, 5 on Right and 1, 2, 3, 4, 5 on Left were debrided with nail nippers then filed with a Dremel nail messi.  Patient tolerated procedure well without complications.    An After Visit Summary was printed and given to the patient at discharge, including (if requested) any available informative/educational handouts regarding diagnosis, treatment, or medications. All questions were answered to patient/family satisfaction. Should symptoms fail to improve or worsen they agree to call or return to clinic or to go to the Emergency Department. Discussed the importance of following up with any needed screening tests/labs/specialist appointments and any requested follow-up recommended by me today. Importance of maintaining follow-up discussed and patient accepts that missed appointments can delay diagnosis and potentially lead to worsening of conditions.    Return in about 9 weeks (around 10/19/2023) for Toenail Care., or sooner if acute issues arise.    This document has been electronically signed by Giovanny Lainez DPM on August 17, 2023 15:57 EDT        English

## 2024-01-02 ENCOUNTER — EMERGENCY (EMERGENCY)
Facility: HOSPITAL | Age: 69
LOS: 0 days | Discharge: HOME | End: 2024-01-02
Attending: STUDENT IN AN ORGANIZED HEALTH CARE EDUCATION/TRAINING PROGRAM
Payer: MEDICARE

## 2024-01-02 VITALS — HEIGHT: 62 IN | WEIGHT: 128.09 LBS

## 2024-01-02 VITALS — WEIGHT: 162.04 LBS

## 2024-01-02 DIAGNOSIS — R53.81 OTHER MALAISE: ICD-10-CM

## 2024-01-02 DIAGNOSIS — R51.9 HEADACHE, UNSPECIFIED: ICD-10-CM

## 2024-01-02 DIAGNOSIS — Z98.890 OTHER SPECIFIED POSTPROCEDURAL STATES: Chronic | ICD-10-CM

## 2024-01-02 DIAGNOSIS — Z91.041 RADIOGRAPHIC DYE ALLERGY STATUS: ICD-10-CM

## 2024-01-02 DIAGNOSIS — Z90.49 ACQUIRED ABSENCE OF OTHER SPECIFIED PARTS OF DIGESTIVE TRACT: Chronic | ICD-10-CM

## 2024-01-02 DIAGNOSIS — R11.0 NAUSEA: ICD-10-CM

## 2024-01-02 DIAGNOSIS — Z20.822 CONTACT WITH AND (SUSPECTED) EXPOSURE TO COVID-19: ICD-10-CM

## 2024-01-02 DIAGNOSIS — Z90.11 ACQUIRED ABSENCE OF RIGHT BREAST AND NIPPLE: Chronic | ICD-10-CM

## 2024-01-02 DIAGNOSIS — B34.9 VIRAL INFECTION, UNSPECIFIED: ICD-10-CM

## 2024-01-02 DIAGNOSIS — Z88.8 ALLERGY STATUS TO OTHER DRUGS, MEDICAMENTS AND BIOLOGICAL SUBSTANCES: ICD-10-CM

## 2024-01-02 LAB
ALBUMIN SERPL ELPH-MCNC: 3.6 G/DL — SIGNIFICANT CHANGE UP (ref 3.3–5)
ALBUMIN SERPL ELPH-MCNC: 3.6 G/DL — SIGNIFICANT CHANGE UP (ref 3.3–5)
ALP SERPL-CCNC: 115 U/L — SIGNIFICANT CHANGE UP (ref 40–120)
ALP SERPL-CCNC: 115 U/L — SIGNIFICANT CHANGE UP (ref 40–120)
ALT FLD-CCNC: 56 U/L — SIGNIFICANT CHANGE UP (ref 12–78)
ALT FLD-CCNC: 56 U/L — SIGNIFICANT CHANGE UP (ref 12–78)
ANION GAP SERPL CALC-SCNC: 5 MMOL/L — SIGNIFICANT CHANGE UP (ref 5–17)
ANION GAP SERPL CALC-SCNC: 5 MMOL/L — SIGNIFICANT CHANGE UP (ref 5–17)
APPEARANCE UR: CLEAR — SIGNIFICANT CHANGE UP
APPEARANCE UR: CLEAR — SIGNIFICANT CHANGE UP
AST SERPL-CCNC: 63 U/L — HIGH (ref 15–37)
AST SERPL-CCNC: 63 U/L — HIGH (ref 15–37)
BACTERIA # UR AUTO: NEGATIVE /HPF — SIGNIFICANT CHANGE UP
BACTERIA # UR AUTO: NEGATIVE /HPF — SIGNIFICANT CHANGE UP
BASOPHILS # BLD AUTO: 0.03 K/UL — SIGNIFICANT CHANGE UP (ref 0–0.2)
BASOPHILS # BLD AUTO: 0.03 K/UL — SIGNIFICANT CHANGE UP (ref 0–0.2)
BASOPHILS NFR BLD AUTO: 0.4 % — SIGNIFICANT CHANGE UP (ref 0–2)
BASOPHILS NFR BLD AUTO: 0.4 % — SIGNIFICANT CHANGE UP (ref 0–2)
BILIRUB SERPL-MCNC: 0.8 MG/DL — SIGNIFICANT CHANGE UP (ref 0.2–1.2)
BILIRUB SERPL-MCNC: 0.8 MG/DL — SIGNIFICANT CHANGE UP (ref 0.2–1.2)
BILIRUB UR-MCNC: NEGATIVE — SIGNIFICANT CHANGE UP
BILIRUB UR-MCNC: NEGATIVE — SIGNIFICANT CHANGE UP
BUN SERPL-MCNC: 22 MG/DL — SIGNIFICANT CHANGE UP (ref 7–23)
BUN SERPL-MCNC: 22 MG/DL — SIGNIFICANT CHANGE UP (ref 7–23)
CALCIUM SERPL-MCNC: 9.3 MG/DL — SIGNIFICANT CHANGE UP (ref 8.5–10.1)
CALCIUM SERPL-MCNC: 9.3 MG/DL — SIGNIFICANT CHANGE UP (ref 8.5–10.1)
CHLORIDE SERPL-SCNC: 106 MMOL/L — SIGNIFICANT CHANGE UP (ref 96–108)
CHLORIDE SERPL-SCNC: 106 MMOL/L — SIGNIFICANT CHANGE UP (ref 96–108)
CO2 SERPL-SCNC: 28 MMOL/L — SIGNIFICANT CHANGE UP (ref 22–31)
CO2 SERPL-SCNC: 28 MMOL/L — SIGNIFICANT CHANGE UP (ref 22–31)
COLOR SPEC: YELLOW — SIGNIFICANT CHANGE UP
COLOR SPEC: YELLOW — SIGNIFICANT CHANGE UP
CREAT SERPL-MCNC: 1.42 MG/DL — HIGH (ref 0.5–1.3)
CREAT SERPL-MCNC: 1.42 MG/DL — HIGH (ref 0.5–1.3)
DIFF PNL FLD: NEGATIVE — SIGNIFICANT CHANGE UP
DIFF PNL FLD: NEGATIVE — SIGNIFICANT CHANGE UP
EGFR: 40 ML/MIN/1.73M2 — LOW
EGFR: 40 ML/MIN/1.73M2 — LOW
EOSINOPHIL # BLD AUTO: 0.02 K/UL — SIGNIFICANT CHANGE UP (ref 0–0.5)
EOSINOPHIL # BLD AUTO: 0.02 K/UL — SIGNIFICANT CHANGE UP (ref 0–0.5)
EOSINOPHIL NFR BLD AUTO: 0.2 % — SIGNIFICANT CHANGE UP (ref 0–6)
EOSINOPHIL NFR BLD AUTO: 0.2 % — SIGNIFICANT CHANGE UP (ref 0–6)
FLUAV AG NPH QL: SIGNIFICANT CHANGE UP
FLUAV AG NPH QL: SIGNIFICANT CHANGE UP
FLUBV AG NPH QL: SIGNIFICANT CHANGE UP
FLUBV AG NPH QL: SIGNIFICANT CHANGE UP
GLUCOSE SERPL-MCNC: 110 MG/DL — HIGH (ref 70–99)
GLUCOSE SERPL-MCNC: 110 MG/DL — HIGH (ref 70–99)
GLUCOSE UR QL: NEGATIVE MG/DL — SIGNIFICANT CHANGE UP
GLUCOSE UR QL: NEGATIVE MG/DL — SIGNIFICANT CHANGE UP
HCT VFR BLD CALC: 39.2 % — SIGNIFICANT CHANGE UP (ref 34.5–45)
HCT VFR BLD CALC: 39.2 % — SIGNIFICANT CHANGE UP (ref 34.5–45)
HGB BLD-MCNC: 12.9 G/DL — SIGNIFICANT CHANGE UP (ref 11.5–15.5)
HGB BLD-MCNC: 12.9 G/DL — SIGNIFICANT CHANGE UP (ref 11.5–15.5)
IMM GRANULOCYTES NFR BLD AUTO: 0.5 % — SIGNIFICANT CHANGE UP (ref 0–0.9)
IMM GRANULOCYTES NFR BLD AUTO: 0.5 % — SIGNIFICANT CHANGE UP (ref 0–0.9)
KETONES UR-MCNC: NEGATIVE MG/DL — SIGNIFICANT CHANGE UP
KETONES UR-MCNC: NEGATIVE MG/DL — SIGNIFICANT CHANGE UP
LEUKOCYTE ESTERASE UR-ACNC: ABNORMAL
LEUKOCYTE ESTERASE UR-ACNC: ABNORMAL
LYMPHOCYTES # BLD AUTO: 1.11 K/UL — SIGNIFICANT CHANGE UP (ref 1–3.3)
LYMPHOCYTES # BLD AUTO: 1.11 K/UL — SIGNIFICANT CHANGE UP (ref 1–3.3)
LYMPHOCYTES # BLD AUTO: 13.6 % — SIGNIFICANT CHANGE UP (ref 13–44)
LYMPHOCYTES # BLD AUTO: 13.6 % — SIGNIFICANT CHANGE UP (ref 13–44)
MCHC RBC-ENTMCNC: 30.4 PG — SIGNIFICANT CHANGE UP (ref 27–34)
MCHC RBC-ENTMCNC: 30.4 PG — SIGNIFICANT CHANGE UP (ref 27–34)
MCHC RBC-ENTMCNC: 32.9 GM/DL — SIGNIFICANT CHANGE UP (ref 32–36)
MCHC RBC-ENTMCNC: 32.9 GM/DL — SIGNIFICANT CHANGE UP (ref 32–36)
MCV RBC AUTO: 92.2 FL — SIGNIFICANT CHANGE UP (ref 80–100)
MCV RBC AUTO: 92.2 FL — SIGNIFICANT CHANGE UP (ref 80–100)
MONOCYTES # BLD AUTO: 0.76 K/UL — SIGNIFICANT CHANGE UP (ref 0–0.9)
MONOCYTES # BLD AUTO: 0.76 K/UL — SIGNIFICANT CHANGE UP (ref 0–0.9)
MONOCYTES NFR BLD AUTO: 9.3 % — SIGNIFICANT CHANGE UP (ref 2–14)
MONOCYTES NFR BLD AUTO: 9.3 % — SIGNIFICANT CHANGE UP (ref 2–14)
NEUTROPHILS # BLD AUTO: 6.2 K/UL — SIGNIFICANT CHANGE UP (ref 1.8–7.4)
NEUTROPHILS # BLD AUTO: 6.2 K/UL — SIGNIFICANT CHANGE UP (ref 1.8–7.4)
NEUTROPHILS NFR BLD AUTO: 76 % — SIGNIFICANT CHANGE UP (ref 43–77)
NEUTROPHILS NFR BLD AUTO: 76 % — SIGNIFICANT CHANGE UP (ref 43–77)
NITRITE UR-MCNC: NEGATIVE — SIGNIFICANT CHANGE UP
NITRITE UR-MCNC: NEGATIVE — SIGNIFICANT CHANGE UP
PH UR: 6 — SIGNIFICANT CHANGE UP (ref 5–8)
PH UR: 6 — SIGNIFICANT CHANGE UP (ref 5–8)
PLATELET # BLD AUTO: 245 K/UL — SIGNIFICANT CHANGE UP (ref 150–400)
PLATELET # BLD AUTO: 245 K/UL — SIGNIFICANT CHANGE UP (ref 150–400)
POTASSIUM SERPL-MCNC: 3.7 MMOL/L — SIGNIFICANT CHANGE UP (ref 3.5–5.3)
POTASSIUM SERPL-MCNC: 3.7 MMOL/L — SIGNIFICANT CHANGE UP (ref 3.5–5.3)
POTASSIUM SERPL-SCNC: 3.7 MMOL/L — SIGNIFICANT CHANGE UP (ref 3.5–5.3)
POTASSIUM SERPL-SCNC: 3.7 MMOL/L — SIGNIFICANT CHANGE UP (ref 3.5–5.3)
PROT SERPL-MCNC: 8.1 GM/DL — SIGNIFICANT CHANGE UP (ref 6–8.3)
PROT SERPL-MCNC: 8.1 GM/DL — SIGNIFICANT CHANGE UP (ref 6–8.3)
PROT UR-MCNC: 100 MG/DL
PROT UR-MCNC: 100 MG/DL
RBC # BLD: 4.25 M/UL — SIGNIFICANT CHANGE UP (ref 3.8–5.2)
RBC # BLD: 4.25 M/UL — SIGNIFICANT CHANGE UP (ref 3.8–5.2)
RBC # FLD: 14.4 % — SIGNIFICANT CHANGE UP (ref 10.3–14.5)
RBC # FLD: 14.4 % — SIGNIFICANT CHANGE UP (ref 10.3–14.5)
RBC CASTS # UR COMP ASSIST: 2 /HPF — SIGNIFICANT CHANGE UP (ref 0–4)
RBC CASTS # UR COMP ASSIST: 2 /HPF — SIGNIFICANT CHANGE UP (ref 0–4)
RSV RNA NPH QL NAA+NON-PROBE: SIGNIFICANT CHANGE UP
RSV RNA NPH QL NAA+NON-PROBE: SIGNIFICANT CHANGE UP
SARS-COV-2 RNA SPEC QL NAA+PROBE: SIGNIFICANT CHANGE UP
SARS-COV-2 RNA SPEC QL NAA+PROBE: SIGNIFICANT CHANGE UP
SODIUM SERPL-SCNC: 139 MMOL/L — SIGNIFICANT CHANGE UP (ref 135–145)
SODIUM SERPL-SCNC: 139 MMOL/L — SIGNIFICANT CHANGE UP (ref 135–145)
SP GR SPEC: 1.02 — SIGNIFICANT CHANGE UP (ref 1–1.03)
SP GR SPEC: 1.02 — SIGNIFICANT CHANGE UP (ref 1–1.03)
SQUAMOUS # UR AUTO: 3 /HPF — SIGNIFICANT CHANGE UP (ref 0–5)
SQUAMOUS # UR AUTO: 3 /HPF — SIGNIFICANT CHANGE UP (ref 0–5)
UROBILINOGEN FLD QL: 0.2 MG/DL — SIGNIFICANT CHANGE UP (ref 0.2–1)
UROBILINOGEN FLD QL: 0.2 MG/DL — SIGNIFICANT CHANGE UP (ref 0.2–1)
WBC # BLD: 8.16 K/UL — SIGNIFICANT CHANGE UP (ref 3.8–10.5)
WBC # BLD: 8.16 K/UL — SIGNIFICANT CHANGE UP (ref 3.8–10.5)
WBC # FLD AUTO: 8.16 K/UL — SIGNIFICANT CHANGE UP (ref 3.8–10.5)
WBC # FLD AUTO: 8.16 K/UL — SIGNIFICANT CHANGE UP (ref 3.8–10.5)
WBC UR QL: 9 /HPF — HIGH (ref 0–5)
WBC UR QL: 9 /HPF — HIGH (ref 0–5)

## 2024-01-02 PROCEDURE — 99284 EMERGENCY DEPT VISIT MOD MDM: CPT | Mod: 25

## 2024-01-02 PROCEDURE — 96374 THER/PROPH/DIAG INJ IV PUSH: CPT

## 2024-01-02 PROCEDURE — 85025 COMPLETE CBC W/AUTO DIFF WBC: CPT

## 2024-01-02 PROCEDURE — 80053 COMPREHEN METABOLIC PANEL: CPT

## 2024-01-02 PROCEDURE — 87086 URINE CULTURE/COLONY COUNT: CPT

## 2024-01-02 PROCEDURE — 71045 X-RAY EXAM CHEST 1 VIEW: CPT

## 2024-01-02 PROCEDURE — 81001 URINALYSIS AUTO W/SCOPE: CPT

## 2024-01-02 PROCEDURE — 71045 X-RAY EXAM CHEST 1 VIEW: CPT | Mod: 26

## 2024-01-02 PROCEDURE — 0241U: CPT

## 2024-01-02 PROCEDURE — 36415 COLL VENOUS BLD VENIPUNCTURE: CPT

## 2024-01-02 PROCEDURE — 99284 EMERGENCY DEPT VISIT MOD MDM: CPT

## 2024-01-02 RX ORDER — ACETAMINOPHEN 500 MG
1000 TABLET ORAL ONCE
Refills: 0 | Status: COMPLETED | OUTPATIENT
Start: 2024-01-02 | End: 2024-01-02

## 2024-01-02 RX ORDER — SODIUM CHLORIDE 9 MG/ML
1000 INJECTION INTRAMUSCULAR; INTRAVENOUS; SUBCUTANEOUS ONCE
Refills: 0 | Status: COMPLETED | OUTPATIENT
Start: 2024-01-02 | End: 2024-01-02

## 2024-01-02 RX ADMIN — Medication 400 MILLIGRAM(S): at 18:02

## 2024-01-02 RX ADMIN — SODIUM CHLORIDE 1000 MILLILITER(S): 9 INJECTION INTRAMUSCULAR; INTRAVENOUS; SUBCUTANEOUS at 18:00

## 2024-01-02 NOTE — ED PROVIDER NOTE - PATIENT PORTAL LINK FT
You can access the FollowMyHealth Patient Portal offered by [facility] by registering at the following website: [email or web address]. By joining [employer]’s FollowMyHealth portal, you will also be able to view your health information using other applications (apps) compatible with our system. You can access the FollowMyHealth Patient Portal offered by [facility] by registering at the following website: [email or web address]. By joining [employer]’s FollowMyHealth portal, you will also be able to view your health information using other applications (apps) compatible with our system.

## 2024-01-02 NOTE — ED PROVIDER NOTE - NSFOLLOWUPINSTRUCTIONS_ED_ALL_ED_FT
[facility] General Internal Medicine  General Internal Medicine  [address] [place] [address]  Phone: [phone]  Fax:     [facility]  Internal Medicine  [address] [place] [address]  Phone: [phone]  Fax: [phone] Syndrome    WHAT YOU NEED TO KNOW:    Viral syndrome is a term used for symptoms of an infection caused by a virus. Viruses are spread easily from person to person through the air and on shared items.    DISCHARGE INSTRUCTIONS:    Call your local emergency number (911 in the ) or have someone else call if:    You have a seizure.    You cannot be woken.    You have chest pain or trouble breathing.  Seek care immediately if:    You have a stiff neck, a bad headache, and sensitivity to light.    You feel weak, dizzy, or confused.    You stop urinating or urinate a lot less than usual.    You cough up blood or thick yellow or green mucus.    You have severe abdominal pain or your abdomen is larger than usual.  Call your doctor if:    Your symptoms do not get better with treatment or get worse after 3 days.    You have a rash or ear pain.    You have burning when you urinate.    You have questions or concerns about your condition or care.  Medicines: You may need any of the following:    Acetaminophen decreases pain and fever. It is available without a doctor's order. Ask how much to take and how often to take it. Follow directions. Read the labels of all other medicines you are using to see if they also contain acetaminophen, or ask your doctor or pharmacist. Acetaminophen can cause liver damage if not taken correctly. Do not use more than 4 grams (4,000 milligrams) total of acetaminophen in one day.    NSAIDs, such as ibuprofen, help decrease swelling, pain, and fever. NSAIDs can cause stomach bleeding or kidney problems in certain people. If you take blood thinner medicine, always ask your healthcare provider if NSAIDs are safe for you. Always read the medicine label and follow directions.    Cold medicine helps decrease swelling, control a cough, and relieve chest or nasal congestion.    Saline nasal spray helps decrease nasal congestion.    Take your medicine as directed. Contact your healthcare provider if you think your medicine is not helping or if you have side effects. Tell him of her if you are allergic to any medicine. Keep a list of the medicines, vitamins, and herbs you take. Include the amounts, and when and why you take them. Bring the list or the pill bottles to follow-up visits. Carry your medicine list with you in case of an emergency.  Manage your symptoms:    Drink liquids as directed to prevent dehydration. Ask how much liquid to drink each day and which liquids are best for you. Ask if you should drink an oral rehydration solution (ORS). An ORS has the right amounts of water, salts, and sugar you need to replace body fluids. This may help prevent dehydration caused by vomiting or diarrhea. Do not drink liquids with caffeine. Liquids with caffeine can make dehydration worse.    Get plenty of rest to help your body heal. Take naps throughout the day. Ask your healthcare provider when you can return to work and your normal activities.    Use a cool mist humidifier to help you breathe easier. Ask your healthcare provider how to use a cool mist humidifier.    Eat honey or use cough drops for a sore throat. Cough drops are available without a doctor's order. Follow directions for taking cough drops.    Do not smoke or be close to anyone who is smoking. Nicotine and other chemicals in cigarettes and cigars can cause lung damage. Smoking can also delay healing. Ask your healthcare provider for information if you currently smoke and need help to quit. E-cigarettes or smokeless tobacco still contain nicotine. Talk to your healthcare provider before you use these products.  Prevent the spread of germs:      Wash your hands often. Wash your hands several times each day. Wash after you use the bathroom, change a child's diaper, and before you prepare or eat food. Use soap and water every time. Rub your soapy hands together, lacing your fingers. Wash the front and back of your hands, and in between your fingers. Use the fingers of one hand to scrub under the fingernails of the other hand. Wash for at least 20 seconds. Rinse with warm, running water for several seconds. Then dry your hands with a clean towel or paper towel. Use hand  that contains alcohol if soap and water are not available. Do not touch your eyes, nose, or mouth without washing your hands first.  Handwashing      Cover a sneeze or cough. Use a tissue that covers your mouth and nose. Throw the tissue away in a trash can right away. Use the bend of your arm if a tissue is not available. Wash your hands well with soap and water or use a hand .    Stay away from others while you are sick. Avoid crowds as much as possible.    Ask about vaccines you may need. Talk to your healthcare provider about your vaccine history. He or she will tell you which vaccines you need, and when to get them.  Get the influenza (flu) vaccine as soon as recommended each year. The flu vaccine is available starting in September or October. Flu viruses change, so it is important to get a flu vaccine every year.    Get the pneumonia vaccine if recommended. This vaccine is usually recommended every 5 years. Your provider will tell you when to get this vaccine, if needed.  Follow up with your doctor as directed: Write down your questions so you remember to ask them during your visits.    Síndrome viral    LO QUE NECESITA SABER:    El síndrome viral es un término usado para los síntomas de sim infección causada por un virus. Los virus son propagados fácilmente de sim persona a otra a través del aire y mediante los objetos que se comparten.    INSTRUCCIONES SOBRE EL [facility]ITAL[facility]IA:    Llame al número local de emergencias (911 en los [place]), o pídale a alguien que llame si:    Usted sufre sim convulsión.    No es posible despertarlo.    Usted tiene dolor torácico y dificultad para respirar.  Busque atención médica de inmediato si:    Usted tiene rigidez en el ventura, dolor de anyi intenso y sensibilidad a la liliya.    Usted se siente débil, mareado o confundido.    Usted luis daniel de orinar u orina menos de lo habitual.    Usted tose lona o sim mucosidad espesa amarilla o wilma.    Usted tiene dolor abdominal severo o masters abdomen está más david de lo habitual.  Llame a masters médico si:    Cassidy síntomas no mejoran con el tratamiento o empeoran después de 3 días.    Usted tiene salpullido o dolor de oído.    Usted siente ardor al orinar.    Usted tiene preguntas o inquietudes acerca de masters condición o cuidado.  Medicamentos:Es posible que usted necesite alguno de los siguientes:    Acetaminofénalivia el dolor y baja la fiebre. Está disponible sin receta médica. Pregunte la cantidad y la frecuencia con que debe tomarlos. Siga las indicaciones. Gisselle las etiquetas de todos los demás medicamentos que esté usando para saber si también contienen acetaminofén, o pregunte a masters médico o farmacéutico. El acetaminofén puede causar daño en el hígado cuando no se blayne de forma correcta. No use más de 4 gramos (4000 miligramos) en total de acetaminofeno en un día.    Los DENISE,bhakti el ibuprofeno, ayudan a disminuir la inflamación, el dolor y la fiebre. Los DENISE pueden causar sangrado estomacal o problemas renales en ciertas personas. Si usted blayne un medicamento anticoagulante, siempre pregúntele a masters médico si los DENISE son seguros para usted. Siempre gisselle la etiqueta de alexis medicamento y siga las instrucciones.    El medicamento para el resfriadoayuda a disminuir la inflamación, a controlar la tos y a aliviar la congestión del pecho o nasal.    El rociador nasal de agua salinaayuda a disminuir la congestión nasal.    [place] cassidy medicamentos bhakti se le haya indicado.Consulte con masters médico si usted eric que masters medicamento no le está ayudando o si presenta efectos secundarios. Infórmele si es alérgico a algún medicamento. Mantenga sim lista actualizada de los medicamentos, las vitaminas y los productos herbales que blayne. Incluya los siguientes datos de los medicamentos: cantidad, frecuencia y motivo de administración. Traiga con usted la lista o los envases de las píldoras a cassidy citas de seguimiento. Lleve la lista de los medicamentos con usted en joseluis de sim emergencia.  El manejo de cassidy síntomas:    [place] líquidos bhakti se le indique para evitar sim deshidratación.Pregunte cuánto líquido debe monica cada día y cuáles líquidos son los más adecuados para usted. Pregunte si usted debería monica sim solución de rehidratación oral (SRO). Sim SRO tiene las cantidades exactas de agua, sal y azúcar que usted necesita para reemplazar los líquidos corporales. [place] podría ayudarlo a evitar la deshidratación a causa del vómito y de la diarrea. No tome líquidos con cafeína. Los líquidos con cafeína pueden empeorar la deshidratación.    Descanse lo suficiente para ayudar a que masters cuerpo sane.[place] siestas chemo el día. Pregunte a masters médico cuándo puede regresar a masters trabajo y a cassidy actividades cotidianas.    Use un humidificador de trang fría para respirar más fácilmente.Pregunte a masters médico cómo usar un humidificador de vapor frío.    Coma miel o use pastillas para la tos para el dolor de garganta.Los caramelos para la tos están disponibles sin receta médica. Siga las indicaciones para monica los caramelos para la tos.    No fume ni esté cerca a alguien que esté fumando.La nicotina y otras sustancias químicas que contienen los cigarrillos y cigarros pueden dañar los pulmones. El fumar también puede retrasar la sanación. Pida información a masters médico si usted actualmente fuma y necesita ayuda para dejar de fumar. Los cigarrillos electrónicos o el tabaco sin humo igualmente contienen nicotina. Consulte con masters médico antes de utilizar estos productos.  Prevenga la propagación de gérmenes:      Lávese las tariq frecuentemente.Lávese las tariq varias veces al día. Lávese después de usar el baño, después de cambiar pañales y antes de preparar la comida o comer. Use siempre agua y jabón. Frótese las tariq enjabonadas, entrelazando los dedos. Lávese el frente y el dorso de las tariq, y entre los dedos. Use los dedos de sim mano para restregar debajo de las uñas de la otra mano. Lávese chemo al menos 20 segundos. Enjuague con agua corriente caliente chemo varios segundos. Luego séquese las tariq con sim toalla limpia o sim toalla de papel. Puede usar un desinfectante para tariq que contenga alcohol, si no hay agua y jabón disponibles. No se toque los ojos, la nariz o la boca sin antes lavarse las tariq.  Lavado de tariq      Cúbrase al toser o estornudar.Use un pañuelo que cubra la boca y la nariz. Arroje el pañuelo a la basura de inmediato. Use el ángulo del brazo si no tiene un pañuelo disponible. Lávese las tariq con agua y jabón o use un desinfectante de tariq.    Manténgase alejado de los demás mientras esté enfermo.Evite las multitudes lo más que pueda.    Pregunte sobre las vacunas que pudiera necesitar.Hable con masters médico sobre masters historial de vacunación. Masters médico le indicará qué vacunas necesita y cuándo recibirlas.  Vacúnese contra la influenza (gripe) tan pronto bhakti se recomiende cada año.La vacuna antigripal se ofrece a partir de septiembre u octubre. Los virus de la gripe cambian, por lo que es importante vacunarse contra la gripe cada año.    Vacúnese contra la neumonía si se recomienda.Esta vacuna generalmente se recomienda cada 5 años. Masters médico le indicará cuándo recibir esta vacuna, de ser necesaria.  Acuda a la consulta de control con masters médico según las indicaciones:Anote cassidy preguntas para que se acuerde de hacerlas chemo cassidy visitas. [facility] General Internal Medicine  General Internal Medicine  [address] [place] [address]  Phone: [phone]  Fax:     [facility]  Internal Medicine  [address] [place] [address]  Phone: [phone]  Fax: [phone] Syndrome    WHAT YOU NEED TO KNOW:    Viral syndrome is a term used for symptoms of an infection caused by a virus. Viruses are spread easily from person to person through the air and on shared items.    DISCHARGE INSTRUCTIONS:    Call your local emergency number (911 in the ) or have someone else call if:    You have a seizure.    You cannot be woken.    You have chest pain or trouble breathing.  Seek care immediately if:    You have a stiff neck, a bad headache, and sensitivity to light.    You feel weak, dizzy, or confused.    You stop urinating or urinate a lot less than usual.    You cough up blood or thick yellow or green mucus.    You have severe abdominal pain or your abdomen is larger than usual.  Call your doctor if:    Your symptoms do not get better with treatment or get worse after 3 days.    You have a rash or ear pain.    You have burning when you urinate.    You have questions or concerns about your condition or care.  Medicines: You may need any of the following:    Acetaminophen decreases pain and fever. It is available without a doctor's order. Ask how much to take and how often to take it. Follow directions. Read the labels of all other medicines you are using to see if they also contain acetaminophen, or ask your doctor or pharmacist. Acetaminophen can cause liver damage if not taken correctly. Do not use more than 4 grams (4,000 milligrams) total of acetaminophen in one day.    NSAIDs, such as ibuprofen, help decrease swelling, pain, and fever. NSAIDs can cause stomach bleeding or kidney problems in certain people. If you take blood thinner medicine, always ask your healthcare provider if NSAIDs are safe for you. Always read the medicine label and follow directions.    Cold medicine helps decrease swelling, control a cough, and relieve chest or nasal congestion.    Saline nasal spray helps decrease nasal congestion.    Take your medicine as directed. Contact your healthcare provider if you think your medicine is not helping or if you have side effects. Tell him of her if you are allergic to any medicine. Keep a list of the medicines, vitamins, and herbs you take. Include the amounts, and when and why you take them. Bring the list or the pill bottles to follow-up visits. Carry your medicine list with you in case of an emergency.  Manage your symptoms:    Drink liquids as directed to prevent dehydration. Ask how much liquid to drink each day and which liquids are best for you. Ask if you should drink an oral rehydration solution (ORS). An ORS has the right amounts of water, salts, and sugar you need to replace body fluids. This may help prevent dehydration caused by vomiting or diarrhea. Do not drink liquids with caffeine. Liquids with caffeine can make dehydration worse.    Get plenty of rest to help your body heal. Take naps throughout the day. Ask your healthcare provider when you can return to work and your normal activities.    Use a cool mist humidifier to help you breathe easier. Ask your healthcare provider how to use a cool mist humidifier.    Eat honey or use cough drops for a sore throat. Cough drops are available without a doctor's order. Follow directions for taking cough drops.    Do not smoke or be close to anyone who is smoking. Nicotine and other chemicals in cigarettes and cigars can cause lung damage. Smoking can also delay healing. Ask your healthcare provider for information if you currently smoke and need help to quit. E-cigarettes or smokeless tobacco still contain nicotine. Talk to your healthcare provider before you use these products.  Prevent the spread of germs:      Wash your hands often. Wash your hands several times each day. Wash after you use the bathroom, change a child's diaper, and before you prepare or eat food. Use soap and water every time. Rub your soapy hands together, lacing your fingers. Wash the front and back of your hands, and in between your fingers. Use the fingers of one hand to scrub under the fingernails of the other hand. Wash for at least 20 seconds. Rinse with warm, running water for several seconds. Then dry your hands with a clean towel or paper towel. Use hand  that contains alcohol if soap and water are not available. Do not touch your eyes, nose, or mouth without washing your hands first.  Handwashing      Cover a sneeze or cough. Use a tissue that covers your mouth and nose. Throw the tissue away in a trash can right away. Use the bend of your arm if a tissue is not available. Wash your hands well with soap and water or use a hand .    Stay away from others while you are sick. Avoid crowds as much as possible.    Ask about vaccines you may need. Talk to your healthcare provider about your vaccine history. He or she will tell you which vaccines you need, and when to get them.  Get the influenza (flu) vaccine as soon as recommended each year. The flu vaccine is available starting in September or October. Flu viruses change, so it is important to get a flu vaccine every year.    Get the pneumonia vaccine if recommended. This vaccine is usually recommended every 5 years. Your provider will tell you when to get this vaccine, if needed.  Follow up with your doctor as directed: Write down your questions so you remember to ask them during your visits.    Síndrome viral    LO QUE NECESITA SABER:    El síndrome viral es un término usado para los síntomas de sim infección causada por un virus. Los virus son propagados fácilmente de sim persona a otra a través del aire y mediante los objetos que se comparten.    INSTRUCCIONES SOBRE EL [facility]OSPITALARIA:    Llame al número local de emergencias (911 en los [place]), o pídale a alguien que llame si:    Usted sufre sim convulsión.    No es posible despertarlo.    Usted tiene dolor torácico y dificultad para respirar.  Busque atención médica de inmediato si:    Usted tiene rigidez en el ventura, dolor de anyi intenso y sensibilidad a la liliya.    Usted se siente débil, mareado o confundido.    Usted luis daniel de orinar u orina menos de lo habitual.    Usted tose lona o sim mucosidad espesa amarilla o wilma.    Usted tiene dolor abdominal severo o masters abdomen está más david de lo habitual.  Llame a masters médico si:    Cassidy síntomas no mejoran con el tratamiento o empeoran después de 3 días.    Usted tiene salpullido o dolor de oído.    Usted siente ardor al orinar.    Usted tiene preguntas o inquietudes acerca de masters condición o cuidado.  Medicamentos:Es posible que usted necesite alguno de los siguientes:    Acetaminofénalivia el dolor y baja la fiebre. Está disponible sin receta médica. Pregunte la cantidad y la frecuencia con que debe tomarlos. Siga las indicaciones. Gisselle las etiquetas de todos los demás medicamentos que esté usando para saber si también contienen acetaminofén, o pregunte a masters médico o farmacéutico. El acetaminofén puede causar daño en el hígado cuando no se blayne de forma correcta. No use más de 4 gramos (4000 miligramos) en total de acetaminofeno en un día.    Los DENISE,bhakti el ibuprofeno, ayudan a disminuir la inflamación, el dolor y la fiebre. Los DENISE pueden causar sangrado estomacal o problemas renales en ciertas personas. Si usted blayne un medicamento anticoagulante, siempre pregúntele a masters médico si los DENISE son seguros para usted. Siempre gisselle la etiqueta de alexis medicamento y siga las instrucciones.    El medicamento para el resfriadoayuda a disminuir la inflamación, a controlar la tos y a aliviar la congestión del pecho o nasal.    El rociador nasal de agua salinaayuda a disminuir la congestión nasal.    [place] cassidy medicamentos bhakti se le haya indicado.Consulte con masters médico si usted eric que masters medicamento no le está ayudando o si presenta efectos secundarios. Infórmele si es alérgico a algún medicamento. Mantenga sim lista actualizada de los medicamentos, las vitaminas y los productos herbales que blayne. Incluya los siguientes datos de los medicamentos: cantidad, frecuencia y motivo de administración. Traiga con usted la lista o los envases de las píldoras a cassidy citas de seguimiento. Lleve la lista de los medicamentos con usted en joseluis de sim emergencia.  El manejo de cassidy síntomas:    [place] líquidos bhakti se le indique para evitar sim deshidratación.Pregunte cuánto líquido debe monica cada día y cuáles líquidos son los más adecuados para usted. Pregunte si usted debería monica sim solución de rehidratación oral (SRO). Sim SRO tiene las cantidades exactas de agua, sal y azúcar que usted necesita para reemplazar los líquidos corporales. [place] podría ayudarlo a evitar la deshidratación a causa del vómito y de la diarrea. No tome líquidos con cafeína. Los líquidos con cafeína pueden empeorar la deshidratación.    Descanse lo suficiente para ayudar a que masters cuerpo sane.[place] siestas chemo el día. Pregunte a masters médico cuándo puede regresar a masters trabajo y a cassidy actividades cotidianas.    Use un humidificador de trang fría para respirar más fácilmente.Pregunte a masters médico cómo usar un humidificador de vapor frío.    Coma miel o use pastillas para la tos para el dolor de garganta.Los caramelos para la tos están disponibles sin receta médica. Siga las indicaciones para monica los caramelos para la tos.    No fume ni esté cerca a alguien que esté fumando.La nicotina y otras sustancias químicas que contienen los cigarrillos y cigarros pueden dañar los pulmones. El fumar también puede retrasar la sanación. Pida información a masters médico si usted actualmente fuma y necesita ayuda para dejar de fumar. Los cigarrillos electrónicos o el tabaco sin humo igualmente contienen nicotina. Consulte con masters médico antes de utilizar estos productos.  Prevenga la propagación de gérmenes:      Lávese las tariq frecuentemente.Lávese las tariq varias veces al día. Lávese después de usar el baño, después de cambiar pañales y antes de preparar la comida o comer. Use siempre agua y jabón. Frótese las tariq enjabonadas, entrelazando los dedos. Lávese el frente y el dorso de las tariq, y entre los dedos. Use los dedos de sim mano para restregar debajo de las uñas de la otra mano. Lávese chemo al menos 20 segundos. Enjuague con agua corriente caliente chemo varios segundos. Luego séquese las tariq con sim toalla limpia o sim toalla de papel. Puede usar un desinfectante para tariq que contenga alcohol, si no hay agua y jabón disponibles. No se toque los ojos, la nariz o la boca sin antes lavarse las tariq.  Lavado de tariq      Cúbrase al toser o estornudar.Use un pañuelo que cubra la boca y la nariz. Arroje el pañuelo a la basura de inmediato. Use el ángulo del brazo si no tiene un pañuelo disponible. Lávese las tariq con agua y jabón o use un desinfectante de tariq.    Manténgase alejado de los demás mientras esté enfermo.Evite las multitudes lo más que pueda.    Pregunte sobre las vacunas que pudiera necesitar.Hable con masters médico sobre masters historial de vacunación. Masters médico le indicará qué vacunas necesita y cuándo recibirlas.  Vacúnese contra la influenza (gripe) tan pronto bhakti se recomiende cada año.La vacuna antigripal se ofrece a partir de septiembre u octubre. Los virus de la gripe cambian, por lo que es importante vacunarse contra la gripe cada año.    Vacúnese contra la neumonía si se recomienda.Esta vacuna generalmente se recomienda cada 5 años. Masters médico le indicará cuándo recibir esta vacuna, de ser necesaria.  Acuda a la consulta de control con masters médico según las indicaciones:Anote cassidy preguntas para que se acuerde de hacerlas chemo cassidy visitas.

## 2024-01-02 NOTE — ED PROVIDER NOTE - OBJECTIVE STATEMENT
68-year-old female presents the ER with several days of fever Tmax 102 Fahrenheit.  She is also complaining of generalized headache with nausea but no vomiting.  Also complaining of chills and general malaise.  No known sick contacts at home.  She was vaccinated for flu and COVID and RSV.  Has been taking Tylenol for fevers.  Last dose of Tylenol was at 5 AM this morning.-year-old

## 2024-01-02 NOTE — ED PROVIDER NOTE - CLINICAL SUMMARY MEDICAL DECISION MAKING FREE TEXT BOX
Adult female comes in with viral symptoms.  Vitals normal.  Exam no signs of meningitis no signs of bacterial infection.  Chest x-ray clear.  UA negative.  Labs within normal limits no white blood cell count.  Likely viral illness.  Will DC.

## 2024-01-02 NOTE — ED ADULT NURSE NOTE - NSFALLUNIVINTERV_ED_ALL_ED
Bed/Stretcher in lowest position, wheels locked, appropriate side rails in place/Call bell, personal items and telephone in reach/Instruct patient to call for assistance before getting out of bed/chair/stretcher/Non-slip footwear applied when patient is off stretcher/[place] to call system/Physically safe environment - no spills, clutter or unnecessary equipment/Purposeful proactive rounding/Room/bathroom lighting operational, light cord in reach Bed/Stretcher in lowest position, wheels locked, appropriate side rails in place/Call bell, personal items and telephone in reach/Instruct patient to call for assistance before getting out of bed/chair/stretcher/Non-slip footwear applied when patient is off stretcher/[place] to call system/Physically safe environment - no spills, clutter or unnecessary equipment/Purposeful proactive rounding/Room/bathroom lighting operational, light cord in reach

## 2024-01-02 NOTE — ED ADULT NURSE NOTE - OBJECTIVE STATEMENT
Pt presents to the ED with complaints of headache, body aches, and subjective fevers. Pt reports that she had a fever of 102.9F yesterday. Pt afebrile at this time. Symptoms began on rosalio and have progressed since then. Pt alert and oriented x4. PMH of HTN, kidney stones. Pt denies cough, chest pain or shortness of breath at this time. Pt received COVID and flu vaccine this season. Denies recent travel or sick contacts.

## 2024-10-11 NOTE — ED ADULT NURSE NOTE - NSSUHOSCREENINGYN_ED_ALL_ED
[FreeTextEntry1] : BRCA2+ MD Rocío Gay MD
Awake/Alert/Cooperative
Yes - the patient is able to be screened

## 2025-02-10 ENCOUNTER — NON-APPOINTMENT (OUTPATIENT)
Age: 70
End: 2025-02-10